# Patient Record
Sex: FEMALE | Race: WHITE | Employment: FULL TIME | URBAN - METROPOLITAN AREA
[De-identification: names, ages, dates, MRNs, and addresses within clinical notes are randomized per-mention and may not be internally consistent; named-entity substitution may affect disease eponyms.]

---

## 2018-12-10 ENCOUNTER — OFFICE VISIT (OUTPATIENT)
Dept: SURGERY | Age: 58
End: 2018-12-10

## 2018-12-10 DIAGNOSIS — E66.9 OBESITY (BMI 35.0-39.9 WITHOUT COMORBIDITY): Primary | ICD-10-CM

## 2018-12-10 DIAGNOSIS — K43.0 INCARCERATED INCISIONAL HERNIA: ICD-10-CM

## 2019-01-07 ENCOUNTER — OFFICE VISIT (OUTPATIENT)
Dept: SURGERY | Age: 59
End: 2019-01-07

## 2019-01-07 VITALS
HEIGHT: 67 IN | WEIGHT: 241.5 LBS | TEMPERATURE: 97.1 F | BODY MASS INDEX: 37.9 KG/M2 | HEART RATE: 84 BPM | OXYGEN SATURATION: 100 % | SYSTOLIC BLOOD PRESSURE: 131 MMHG | DIASTOLIC BLOOD PRESSURE: 83 MMHG | RESPIRATION RATE: 16 BRPM

## 2019-01-07 DIAGNOSIS — E66.01 SEVERE OBESITY (HCC): Primary | ICD-10-CM

## 2019-01-07 DIAGNOSIS — R60.9 EDEMA, UNSPECIFIED TYPE: ICD-10-CM

## 2019-01-07 DIAGNOSIS — R74.8 ELEVATED LIVER ENZYMES: ICD-10-CM

## 2019-01-07 DIAGNOSIS — K30 FUNCTIONAL DYSPEPSIA: ICD-10-CM

## 2019-01-07 DIAGNOSIS — G47.33 OBSTRUCTIVE SLEEP APNEA: ICD-10-CM

## 2019-01-07 DIAGNOSIS — E66.9 OBESITY (BMI 30-39.9): ICD-10-CM

## 2019-01-07 RX ORDER — FUROSEMIDE 20 MG/1
20 TABLET ORAL AS NEEDED
COMMUNITY
Start: 2017-01-13 | End: 2019-11-27

## 2019-01-07 RX ORDER — CYCLOBENZAPRINE HCL 5 MG
5 TABLET ORAL 3 TIMES DAILY
COMMUNITY
Start: 2018-12-13 | End: 2019-11-27

## 2019-01-07 RX ORDER — IBUPROFEN 200 MG
TABLET ORAL
COMMUNITY
End: 2019-11-27

## 2019-01-07 NOTE — PROGRESS NOTES
Bariatric Surgery Consultation Subjective: The patient is a 66 y.o. obese female with a Body mass index is 37.82 kg/m². .  The patient is at her heaviest weight for the past several years. she has been overweight since having children. she has been considering surgery since year. she desires surgery at this time because of multiple health concerns and their lifestyle issues which are hindered by their weight. she has been referred by his family physician Dr Verena Sher for evaluation and treatment of their obesity via surgical intervention. Angelica Mcpherson has tried multiple diets in her lifetime most recently tried physician supervised, behavior modification, unsupervised diets, Weight Watchers, Ian Eros, Atkins and Phentermine Bariatric comorbidities present are Patient Active Problem List  
Diagnosis Code  Severe obesity (HCC) E66.01  
 Obstructive sleep apnea G47.33  
 Edema R60.9  Elevated sedimentation rate R70.0  Elevated liver enzymes R74.8  Obesity (BMI 35.0-39.9 without comorbidity) E66.9  Severe obesity (BMI 35.0-39. 9) with comorbidity ([address] E66.01 The patient is considering laparoscopic gastric bypass surgery for surgical weight loss due to their ineffective progress with medical forms of weight loss and the urging of their physician who cares for their primary medical issues. The patient  now presents  for consideration for weight loss surgery understanding the benefits of this over a medical approach of weight loss as was discussed in our presentation on weight loss surgery. They have discussed their plans both with their family and primary care physician who is in support of their pursuit of such. The patient has not had health issues as of late and denies and gastrointestinal disturbances other than what is outlined below in their review of symptoms.  All of their prior evaluations available by both their PCP's and specialists physicians have been reviewed today either in the Care Everywhere portal or scanned under the media tab. I have spent a large portion of my initial consultation today reviewing the patients current dietary habits which have contributed to their health issues and obesity. I have suggested to them personally a dietary regimen that they can initiate now to help with their status as it pertains to their weight. They understand that the most important aspect of their journey through their weight loss endeavor will be their adherence to a new lifestyle of healthy eating behavior. They also understand that an adherence to an exercise program will not only help with weight loss but is ultimately important in weight maintenance. The patients goal weight is 165 lb. These goals are consistent with expected outcomes of their desired operation. her Medical goals are resolution of these health issues. Patient Active Problem List  
 Diagnosis Date Noted  Severe obesity ([address] 01/07/2019  Obstructive sleep apnea  Edema  Elevated sedimentation rate  Elevated liver enzymes  Obesity (BMI 35.0-39.9 without comorbidity)  Severe obesity (BMI 35.0-39. 9) with comorbidity ([address] Past Surgical History:  
Procedure Laterality Date  HX ORTHOPAEDIC Right 2005  
 skin graft right first digit after trauma Social History Tobacco Use  Smoking status: Never Smoker  Smokeless tobacco: Never Used Substance Use Topics  Alcohol use: Yes Frequency: 4 or more times a week Comment: wine Family History Problem Relation Age of Onset  Coronary Artery Disease Mother CABG 1999  Diabetes Father  Cancer Brother   
     skin cancer Current Outpatient Medications Medication Sig Dispense Refill  furosemide (LASIX) 20 mg tablet Take 20 mg by mouth as needed.     
 cyclobenzaprine (FLEXERIL) 5 mg tablet Take 5 mg by mouth three (3) times daily.  ibuprofen (ADVIL) 200 mg tablet Take  by mouth. No Known Allergies Review of Systems:  
  
 
 
 
General - No history or complaints of unexpected fever, chills, or weight loss Head/Neck - No history or complaints of headache, diplopia, dysphagia, hearing loss Cardiac - No history or complaints of chest pain, palpitations, murmur, or shortness of breath Pulmonary - No history or complaints of shortness of breath, productive cough, hemoptysis Gastrointestinal - Occ reflux noted which is related to spicy food,no  abdominal pain, obstipation/constipation or blood per rectum Genitourinary - No history or complaints of hematuria/dysuria, stress urinary incontinence symptoms, or renal lithiasis Musculoskeletal - has joint pain in their legs,  no muscular weakness Hematologic - No history or complaints of bleeding disorders,  No blood transfusions Neurologic - No history or complaints of  migraine headaches, seizure activity, syncopal episodes, TIA or stroke Integumentary - No history or complaints of rashes, abnormal nevi, skin cancer Gynecological - No abnormal bleeding or dysuria Objective:  
 
Visit Vitals /83 (BP 1 Location: Left arm, BP Patient Position: Sitting) Pulse 84 Temp 97.1 °F (36.2 °C) (Temporal) Resp 16 Ht 5' 7\" (1.702 m) Wt 109.5 kg (241 lb 8 oz) SpO2 100% BMI 37.82 kg/m² Physical Examination: General appearance - alert, well appearing, and in no distress and oriented to person, place, and time Mental status - alert, oriented to person, place, and time, normal mood, behavior, speech, dress, motor activity, and thought processes Eyes - pupils equal and reactive, extraocular eye movements intact, sclera anicteric, left eye normal, right eye normal 
Ears - right ear normal, left ear normal 
Nose - normal and patent, no erythema, discharge or polyps Mouth - mucous membranes moist, pharynx normal without lesions Neck - supple, no significant adenopathy Lymphatics - no palpable lymphadenopathy, no hepatosplenomegaly Chest - clear to auscultation, no wheezes, rales or rhonchi, symmetric air entry Heart - normal rate, regular rhythm, normal S1, S2, no murmurs, rubs, clicks or gallops Abdomen - soft, nontender, nondistended, no masses or organomegaly Back exam - full range of motion, no tenderness, palpable spasm or pain on motion Neurological - alert, oriented, normal speech, no focal findings or movement disorder noted Musculoskeletal - no joint tenderness, deformity or swelling Extremities - peripheral pulses normal, no pedal edema, no clubbing or cyanosis Skin - normal coloration and turgor, no rashes, no suspicious skin lesions noted Labs:  
 
No results found for: WBC, WBCLT, HGBPOC, HGB, HGBP, HCTPOC, HCT, PHCT, RBCH, PLT, MCV, HGBEXT, HCTEXT, PLTEXT, HGBEXT, HCTEXT, PLTEXT No results found for: NA, K, CL, CO2, AGAP, GLU, BUN, CREA, BUCR, GFRAA, GFRNA, CA, TBIL, TBILI, GPT, SGOT, AP, TP, ALB, GLOB, AGRAT, ALT No results found for: IRON, FE, TIBC, IBCT, PSAT, FERR No results found for: FOL, RBCF No results found for: Shelby Suárez, Sly Helms, Floyce Laughter, [id number] Reviewed labs under Care Everywhere with CMP and CBC from 12/2018. TSH checked Sept 2018. Assessment: Morbid obesity with associated comorbidity Plan:  
 
laparoscopic gastric bypass surgery This is a 66 y.o. female with a BMI of Body mass index is 37.82 kg/m². and the weight-related co-morbidties of sleep apnea. Pearl Kapoor meets the NIH criteria for bariatric surgery based upon the BMI of Body mass index is 37.82 kg/m². and multiple weight-related co-morbidties.  Pearl Kapoor has elected laparoscopic gastric bypass as her intervention of choice for treatment of morbid obestiy through surgical means secondary to its uniform results,  profound baseline suppression of hunger and pace at which weight is lost. 
 
 In the office today, following Amy's history and physical examination, a 30 minute discussion regarding the anatomic alterations for the laparoscopic gastric bypass  was undertaken. The dietary expectations and the patient  dependent factors for success were thoroughly discussed, to include the need for interval follow-up and long-term dietary changes associated with success. The possible short and long term  complications of the gastric bypass were also discussed, to include but not limited to;death, DVT/PE, staple line leak, bleeding, stricture formation, infection,internal hernia  and pouch dilation. Specific weight related outcomes for success were also discussed with an emphasis on careful and close follow-up with the first year and dietary behavior modification over the first years as baseline cyclical hunger returns  The patient expressed an understanding of the above factors, and her questions were answered in their entirety. In addition, the patient attended a 1.5 hour power point seminar regarding obesity, surgical weight loss including, adjustable gastric band, gastric bypass, and sleeve gastrectomy. This discussion contrasted the different surgical techniques, mechanisms of actions and expected outcomes, and surgical and medical risks associated with each procedure. During this seminar, there was a long question and answer session where each questions was answered until there were no additional questions. Today, the patient had all of her questions answered and desires to proceed with  bariatric surgery initially choosing the gastric bypass as her surgical option. Secondary Diagnoses:  
 
Dietary Intervention  - The patient is currently scheduled to see or has been followed by a bariatric nutritionist for an attempt at preoperative weight loss as has been dictated by their insurance carrier.   They will be assessed at various times during their follow up to evaluate their progress depending on the length of time that is required once again by their carrier. I have explained the importance of preoperative weight loss and the benefits regarding lower surgical risk and also assisting the patient in reaching their weight loss goal.  Finally they understand there is a physiologic benefit from the standpoint of hepatic volume reduction and reduction of central visceral adiposity preoperatively. I have reiterated the importance of a low carbohydrate and high protein regimen to achieve their stated goal. I have reviewed their current eating behavior prior to this encounter and explained to them in an exhaustive fashion the appropriate diet that they should adhere to. They have been encouraged to loose weight pre operatively and understand it is our prerogative to cancel surgery or postpone their procedure in the event of significant weight gain. Obstructive Sleep Apnea -The patient understands the association of sleep apnea and obesity and the additional risk that it caries related to post surgical complications. If they have not been tested for sleep apnea and I feel they are at increased risk for this diagnosis, then they will be scheduled for a consultation with a Pulmonologist for such. In the event that they walter this diagnosis we will have the patient bring their CPAP machine to the hospital for use both postoperatively in the PACU and on the floor at its appropriate setting.  We will have them continue using it while at home after surgery and follow up with their pulmonologist 6 months after to be retested to see if it can be discontinued at that time period. Signed By: Nuria Bui MD   
 January 7, 2019

## 2019-01-07 NOTE — PATIENT INSTRUCTIONS
New patient Instructions 1. Ensure all pre-operative insurances requirements are complete (ie; dietary visits, psychology consults, primary care documentation, etc) 2. Adhere to pre-operative weight loss / weight maintenance plan discussed in the office today. 3. Contact the office with any questions on pre-operative clearance issues (ie; cardiology work-up, pulmonary work-up, upper GI study, etc). 4. If a barium upper GI study has been ordered for your evaluation, make sure you are on liquids only the morning of the procedure. Body Mass Index: Care Instructions Your Care Instructions Body mass index (BMI) can help you see if your weight is raising your risk for health problems. It uses a formula to compare how much you weigh with how tall you are. · A BMI lower than 18.5 is considered underweight. · A BMI between 18.5 and 24.9 is considered healthy. · A BMI between 25 and 29.9 is considered overweight. A BMI of 30 or higher is considered obese. If your BMI is in the normal range, it means that you have a lower risk for weight-related health problems. If your BMI is in the overweight or obese range, you may be at increased risk for weight-related health problems, such as high blood pressure, heart disease, stroke, arthritis or joint pain, and diabetes. If your BMI is in the underweight range, you may be at increased risk for health problems such as fatigue, lower protection (immunity) against illness, muscle loss, bone loss, hair loss, and hormone problems. BMI is just one measure of your risk for weight-related health problems. You may be at higher risk for health problems if you are not active, you eat an unhealthy diet, or you drink too much alcohol or use tobacco products. Follow-up care is a key part of your treatment and safety.  Be sure to make and go to all appointments, and call your doctor if you are having problems. It's also a good idea to know your test results and keep a list of the medicines you take. How can you care for yourself at home? · Practice healthy eating habits. This includes eating plenty of fruits, vegetables, whole grains, lean protein, and low-fat dairy. · If your doctor recommends it, get more exercise. Walking is a good choice. Bit by bit, increase the amount you walk every day. Try for at least 30 minutes on most days of the week. · Do not smoke. Smoking can increase your risk for health problems. If you need help quitting, talk to your doctor about stop-smoking programs and medicines. These can increase your chances of quitting for good. · Limit alcohol to 2 drinks a day for men and 1 drink a day for women. Too much alcohol can cause health problems. If you have a BMI higher than 25 · Your doctor may do other tests to check your risk for weight-related health problems. This may include measuring the distance around your waist. A waist measurement of more than 40 inches in men or 35 inches in women can increase the risk of weight-related health problems. · Talk with your doctor about steps you can take to stay healthy or improve your health. You may need to make lifestyle changes to lose weight and stay healthy, such as changing your diet and getting regular exercise. If you have a BMI lower than 18.5 · Your doctor may do other tests to check your risk for health problems. · Talk with your doctor about steps you can take to stay healthy or improve your health. You may need to make lifestyle changes to gain or maintain weight and stay healthy, such as getting more healthy foods in your diet and doing exercises to build muscle. Where can you learn more? Go to http://collin-rajan.info/. Enter S176 in the search box to learn more about \"Body Mass Index: Care Instructions. \" Current as of: June 26, 2018 Content Version: 11.8 © 7216-0959 [employer]. Care instructions adapted under license by [employer] (which disclaims liability or warranty for this information). If you have questions about a medical condition or this instruction, always ask your healthcare professional. [address] any warranty or liability for your use of this information.

## 2019-01-08 ENCOUNTER — CLINICAL SUPPORT (OUTPATIENT)
Dept: SURGERY | Age: 59
End: 2019-01-08

## 2019-01-08 VITALS — BODY MASS INDEX: 38.45 KG/M2 | HEIGHT: 67 IN | WEIGHT: 245 LBS

## 2019-01-08 DIAGNOSIS — E66.01 SEVERE OBESITY (HCC): Primary | ICD-10-CM

## 2019-01-08 NOTE — PATIENT INSTRUCTIONS
Goals: 1. Start an exercise routine of walking 1-3 days a week (as able per leg pain) for 1.5 miles  2. Start taking a daily multivitamin (we recommend the [employer] complete chewable) once a day now and twice a day after surgery for life   3. Work on consistently eating three meals a day - you can use a protein shake as a meal replacement or a snack  4.  Work on switching your regular juice to diet cran-grape and cran-apple - if you want you can add unflavored protein powder to this beverage - work to get 64 ounces of non-caloric fluid a day

## 2019-02-13 ENCOUNTER — HOSPITAL ENCOUNTER (OUTPATIENT)
Age: 59
Setting detail: OUTPATIENT SURGERY
Discharge: HOME | End: 2019-02-13
Attending: SPECIALIST | Admitting: SPECIALIST
Payer: COMMERCIAL

## 2019-02-13 ENCOUNTER — CLINICAL SUPPORT (OUTPATIENT)
Dept: SURGERY | Age: 59
End: 2019-02-13

## 2019-02-13 ENCOUNTER — APPOINTMENT (OUTPATIENT)
Dept: GENERAL RADIOLOGY | Age: 59
End: 2019-02-13
Attending: SPECIALIST
Payer: COMMERCIAL

## 2019-02-13 VITALS
OXYGEN SATURATION: 98 % | TEMPERATURE: 96.7 F | DIASTOLIC BLOOD PRESSURE: 81 MMHG | SYSTOLIC BLOOD PRESSURE: 146 MMHG | HEART RATE: 92 BPM | HEIGHT: 67 IN | RESPIRATION RATE: 16 BRPM | BODY MASS INDEX: 37.15 KG/M2 | WEIGHT: 236.7 LBS

## 2019-02-13 VITALS — HEIGHT: 67 IN | WEIGHT: 238 LBS | BODY MASS INDEX: 37.35 KG/M2

## 2019-02-13 VITALS — WEIGHT: 241 LBS | BODY MASS INDEX: 37.83 KG/M2 | HEIGHT: 67 IN

## 2019-02-13 DIAGNOSIS — E66.01 MORBID OBESITY (HCC): Primary | ICD-10-CM

## 2019-02-13 DIAGNOSIS — E66.01 MORBID OBESITY (HCC): ICD-10-CM

## 2019-02-13 PROCEDURE — 76040000019: Performed by: SPECIALIST

## 2019-02-13 PROCEDURE — 74011000255 HC RX REV CODE- 255: Performed by: SPECIALIST

## 2019-02-13 PROCEDURE — 74240 X-RAY XM UPR GI TRC 1CNTRST: CPT

## 2019-02-13 PROCEDURE — 77030032490 HC SLV COMPR SCD KNE COVD -B: Performed by: SPECIALIST

## 2019-02-13 NOTE — PATIENT INSTRUCTIONS
Goals: 1. Continue working to eat three meals a day - look for a protein shake you can use or try unflavored protein powder in diet juice or tea, can try Isopure ([employer] and vitamin shop[employer])  2. Work on staying active throughout your day and as weather permits/schedule walk 1-3 days a week for 30 minutes  3.  Continue drinking diet juice and decaf unsweet tea (sweetened with splenda)

## 2019-02-13 NOTE — PROCEDURES
Patient:Amy Rodriguez   : 1960  Medical Record [id number]            PREPROCEDURE DIAGNOSIS: This patient is preoperative for laparoscopic gastric bypass surgeryprocedure with a history of  reflux disease. POSTPROCEDURE DIAGNOSIS: This patient is preoperative for laparoscopic gastric bypass surgeryprocedure with a history of  reflux disease. PROCEDURES PERFORMED: Upper GI study with barium. ESTIMATED BLOOD LOSS: None. SPECIMENS: None. STATEMENT OF MEDICAL NECESSITY: The patient is a patient with a  longstanding history of obesity. They are now considering the laparoscopic gastric bypass surgeryprocedure as a means of surgical weight control and due to their history of reflux disease and are being assessed preoperatively for such. DESCRIPTION OF PROCEDURE: The patient was brought to the fluoroscopy unit and  was given thin barium. On swallowing of barium, they were noted to have  normal peristalsis of their esophagus. They had prompt filling of distal  esophagus with tapering into the gastroesophageal junction. There was no evidence of a hiatal hernia present. Contrast then filled the gastric cardia, fundus,body and pre pyloric region with no abnormalities noted. Contrast then exited the pylorus in normal fashion. No obstruction was noted. There was no evidence of reflux noted.     (normal anatomy)    Odalis Wilkes MD

## 2019-02-13 NOTE — PROGRESS NOTES
Yesica Michele participated in an educational session on the importance of starting to make healthy choices prior to weight loss surgery. General healthy foods were reviewed. Diet history was reviewed. Patient set a dietary, exercise, and behavioral goal in order to start making healthy changes now.      Visit Vitals  Ht 5' 7\" (1.702 m)   Wt 109.3 kg (241 lb)   BMI 37.75 kg/m²     Lillie Sahni

## 2019-02-13 NOTE — PROGRESS NOTES
Medical Weight Loss Multi-Disciplinary Program    Name: Yesica Michele   : 1960    Session# 3  Date: 2019     Height: 5' 7\" (170.2 cm)    Weight: 108 kg (238 lb) lbs. Body mass index is 37.28 kg/m². Pounds Lost: 7     Dietary Instructions    Reviewed intake  Instruction given for personal dietary changes  Discussed perceived compliance  Comments: reviewed patient's past monthly diet hx. Patient has been working on eating three meals a day - still only eating 2 meals not using a protein shake yet. Did switch her juice to diet, and decreased her tea (unsweet tea with splenda) and only drinking that two times a week. Physical Activity/Exercise    Reviewed Activity Log  Discussed Perceived Compliance  Reasonable Goals Set  Motivation  Comments: patient did not exercise much this month, she does stay active throughout her day     Behavior Modification    Reviewed behavior modification log  Identify obstacles to trigger change  Achieving/Rewarding goals met  Positive attitude  Discussed perceived compliance  Comments:     Goals:  1. Continue working to eat three meals a day - look for a protein shake you can use or try unflavored protein powder in diet juice or tea, can try Isopure ([employer] and vitamin shop[employer])  2. Work on staying active throughout your day and as weather permits/schedule walk 1-3 days a week for 30 minutes  3.  Continue drinking diet juice and decaf unsweet tea (sweetened with splenda)     Candidate for surgery (per RD): pending     Dietitian: Lillie Sahni

## 2019-03-20 ENCOUNTER — CLINICAL SUPPORT (OUTPATIENT)
Dept: SURGERY | Age: 59
End: 2019-03-20

## 2019-03-20 VITALS — HEIGHT: 67 IN | BODY MASS INDEX: 37.83 KG/M2 | WEIGHT: 241 LBS

## 2019-03-20 DIAGNOSIS — E66.9 OBESITY (BMI 30-39.9): Primary | ICD-10-CM

## 2019-03-20 NOTE — PROGRESS NOTES
Medical Weight Loss Multi-Disciplinary Program    Name: Chester Shipman   : 1960    Session# 4  Date: 3/20/2019     Height: 5' 7\" (170.2 cm)    Weight: 109.3 kg (241 lb) lbs. Body mass index is 37.75 kg/m². Pounds Gained: 4    Dietary Instructions    Reviewed intake  Instruction given for personal dietary changes  Discussed perceived compliance  Comments: reviewed patient's past monthly diet hx. Patient has been working on decreasing her sugar intake - not using actual sugar, but using splenda in place. Patient is still working on eating three meals a day - going to work to use a protein shake as a meal replacement - purchased one at [employer] - but did not like it - has not tried [employer], or and unflavored protein powder. Breakfast: skipping    Lunch: salad or soup     Dinner (around 7:30-8 pm - goes to bed 10:30-11): grilled protein (steak or chicken), salad and asparagus, once in a while will eat a baked potato     Physical Activity/Exercise    Reviewed Activity Log  Discussed Perceived Compliance  Reasonable Goals Set  Motivation  Comments: patient has been walking at work, and working to walk 3 days a week for 30 minutes     Behavior Modification    Reviewed behavior modification log  Identify obstacles to trigger change  Achieving/Rewarding goals met  Positive attitude  Discussed perceived compliance  Comments:     Goals:  1.  Work on consistently eating three meals a day - focusing on 3-5 ounces of protein at all meals and 1-2 servings of non-starchy vegetables (1/2 - 1 cup each)   - For protein shakes:    - Premier protein - chocolate, vanilla, banana and caramel are the flavors available now - for the chocolate, vanilla and caramel - as a meal   replacement for breakfast    - Unflavored protein powder - GenePro ([employer],[address] or [employer]) or Matti Camara (our office) - you can put that into any sugar-free, diet, decaf   beverage with some flavoring to it, or you can put it into low-sugar yogurt or SF pudding - so in the morning you can add to your decaf artificially   sweetened iced tea - mix it in a protein shaker bottle     2. For meals: work to make sure you're eating within 2 hours of waking up and no later than 7:30 pm (if you're going to eat later than that use another protein shake in place of a heavy meal real late at night)    3. Fluid: continue working to get 64 ounces a day of non-caloric fluid a day    - Beverages that count: protein shakes, water, Crystal Light, decaf unsweet tea, propel water, G2, Powerade zero, diet snapple     4. Remember to work towards the following totals each day:   - protein: 100 grams a day    - calories: 9293-8833 a day   - 64 ounces of fluid a day    - carbohydrates: 50-75 grams or less per day     5. Food log: work to track your daily intake on a piece of paper, or you can use an sandrine like [employer]    6. Continue to work on walking 3 days a week for 30 minutes      7.. For carbohydrates: work to make sure you're measuring them out and tracking them on a daily basis - this is important because carbohydrates will come back into the picture once you reach your weight loss goal, so the more familiar/comfortable you are with measuring/tracking the less intimidating it will be to bring them back long-term   Carbohydrate foods include:  - Fruit, Grains, Starchy vegetables  One serving of a carbohydrate food = 15 grams of carbohydrate and 60-80 calories. Also, sugar and sweets in all forms (regular white sugar, brown sugar, sugar in the raw, honey, syrup, agave nectar, molasses, regular flavored coffee creamer, etc.), are carbohydrates. You are working to eliminate added sugar in your diet prior to surgery.      In general:    1 serving of  fruit = ½ cup fruit (chopped, grapes, or berries), ½ cup canned fruit in natural juice or water (discard fluid), 1 small fresh fruit, ½ banana, 2 tablespoons dried fruit, 4 oz 100% fruit juice     1 serving of starchy vegetables = ½ cup cooked (most common are potatoes of all kinds, corn, or green peas)    1 serving of grains (these vary the most so read labels and use computer/phone application if able!) =     - -Bread: 1 slice small sandwich bread, ¼ large bagel, ½ English muffin, ½ hot dog or hamburger bun, 1 small roll, 1 (6in across) tortilla    - Cereal: ¼ cup granola, ½ cup cooked oatmeal, ½ cup cooked cream of wheat, ½ cup cooked grits, ½ cup cereal     - Pasta/Rice/Quinoa/Barley: 1/3 cup cooked pasta, 1/3 cup cooked couscous, 1/3 cup cooked rice, ½ cup cooked wild rice, 1/3 cup cooked quinoa, 1/3 cup cooked barley    As always, whole grains are the best grains! Candidate for surgery (per RD):  PENDING     Dietitian: Moise Pink

## 2019-03-20 NOTE — PATIENT INSTRUCTIONS
Goals: 1. Work on consistently eating three meals a day - focusing on 3-5 ounces of protein at all meals and 1-2 servings of non-starchy vegetables (1/2 - 1 cup each)   - For protein shakes:    - Premier protein - chocolate, vanilla, banana and caramel are the flavors available now - for the chocolate, vanilla and caramel - as a meal   replacement for breakfast    - Unflavored protein powder - GenePro ([employer] or [employer]) or Valeria Malagasy (our office) - you can put that into any sugar-free, diet, decaf   beverage with some flavoring to it, or you can put it into low-sugar yogurt or SF pudding - so in the morning you can add to your decaf artificially   sweetened iced tea - mix it in a protein shaker bottle     2. For meals: work to make sure you're eating within 2 hours of waking up and no later than 7:30 pm (if you're going to eat later than that use another protein shake in place of a heavy meal real late at night)    3. Fluid: continue working to get 64 ounces a day of non-caloric fluid a day    - Beverages that count: protein shakes, water, Crystal Light, decaf unsweet tea, propel water, G2, Powerade zero, diet snapple     4. Remember to work towards the following totals each day:   - protein: 100 grams a day    - calories: 6310-6180 a day   - 64 ounces of fluid a day    - carbohydrates: 50-75 grams or less per day     5. Food log: work to track your daily intake on a piece of paper, or you can use an sandrine like [employer]    6.  Continue to work on walking 3 days a week for 30 minutes      7.. For carbohydrates: work to make sure you're measuring them out and tracking them on a daily basis - this is important because carbohydrates will come back into the picture once you reach your weight loss goal, so the more familiar/comfortable you are with measuring/tracking the less intimidating it will be to bring them back long-term   Carbohydrate foods include:  - Fruit, Grains, Starchy vegetables  One serving of a carbohydrate food = 15 grams of carbohydrate and 60-80 calories. Also, sugar and sweets in all forms (regular white sugar, brown sugar, sugar in the raw, honey, syrup, agave nectar, molasses, regular flavored coffee creamer, etc.), are carbohydrates. You are working to eliminate added sugar in your diet prior to surgery. In general:    1 serving of  fruit = ½ cup fruit (chopped, grapes, or berries), ½ cup canned fruit in natural juice or water (discard fluid), 1 small fresh fruit, ½ banana, 2 tablespoons dried fruit, 4 oz 100% fruit juice     1 serving of starchy vegetables = ½ cup cooked (most common are potatoes of all kinds, corn, or green peas)    1 serving of grains (these vary the most so read labels and use computer/phone application if able!) =     - -Bread: 1 slice small sandwich bread, ¼ large bagel, ½ English muffin, ½ hot dog or hamburger bun, 1 small roll, 1 (6in across) tortilla    - Cereal: ¼ cup granola, ½ cup cooked oatmeal, ½ cup cooked cream of wheat, ½ cup cooked grits, ½ cup cereal     - Pasta/Rice/Quinoa/Barley: 1/3 cup cooked pasta, 1/3 cup cooked couscous, 1/3 cup cooked rice, ½ cup cooked wild rice, 1/3 cup cooked quinoa, 1/3 cup cooked barley    As always, whole grains are the best grains!

## 2019-04-18 ENCOUNTER — OFFICE VISIT (OUTPATIENT)
Dept: SURGERY | Age: 59
End: 2019-04-18

## 2019-04-18 ENCOUNTER — CLINICAL SUPPORT (OUTPATIENT)
Dept: SURGERY | Age: 59
End: 2019-04-18

## 2019-04-18 VITALS
HEIGHT: 67 IN | HEART RATE: 86 BPM | DIASTOLIC BLOOD PRESSURE: 79 MMHG | SYSTOLIC BLOOD PRESSURE: 123 MMHG | WEIGHT: 236 LBS | BODY MASS INDEX: 37.04 KG/M2 | OXYGEN SATURATION: 100 % | TEMPERATURE: 97.2 F

## 2019-04-18 VITALS — HEIGHT: 67 IN | WEIGHT: 236 LBS | BODY MASS INDEX: 37.04 KG/M2

## 2019-04-18 DIAGNOSIS — E66.01 SEVERE OBESITY (HCC): Primary | ICD-10-CM

## 2019-04-18 DIAGNOSIS — R74.8 ELEVATED LIVER ENZYMES: ICD-10-CM

## 2019-04-18 DIAGNOSIS — E66.9 OBESITY (BMI 30-39.9): Primary | ICD-10-CM

## 2019-04-18 DIAGNOSIS — G47.33 OBSTRUCTIVE SLEEP APNEA: ICD-10-CM

## 2019-04-18 DIAGNOSIS — E66.01 SEVERE OBESITY (BMI 35.0-39.9) WITH COMORBIDITY (HCC): ICD-10-CM

## 2019-04-18 DIAGNOSIS — K57.92 DIVERTICULITIS: ICD-10-CM

## 2019-04-18 NOTE — PROGRESS NOTES
Medical Weight Loss Multi-Disciplinary Program    Name: Stanley Car   : 1960    Session# 5  Date: 2019     Height: 5' 7\" (170.2 cm)    Weight: 107 kg (236 lb) lbs. Body mass index is 36.96 kg/m². Pounds Lost: 6     Dietary Instructions    Reviewed intake  Instruction given for personal dietary changes  Discussed perceived compliance  Comments: reviewed patient's past monthly diet hx. Patient has been working on eating three meals a day using a protein shake as a meal replacement or snack. She's been focusing on eating protein and non-starchy vegetables at all meals and snacks. She's also been working drinking 64 ounces of non-caloric fluid a day - not drinking anything with caffeine on a daily basis - maybe unsweet iced tea two times a week. Physical Activity/Exercise    Reviewed Activity Log  Discussed Perceived Compliance  Reasonable Goals Set  Motivation  Comments: patient has been walking 3 times a week for 30 minutes     Behavior Modification    Reviewed behavior modification log  Identify obstacles to trigger change  Achieving/Rewarding goals met  Positive attitude  Discussed perceived compliance  Comments:     Goals:  1.  Work on consistently eating three meals a day - focusing on 3-5 ounces of protein at all meals and 1-2 servings of non-starchy vegetables (1/2 - 1 cup each)              - For protein shakes:                          - Premier protein - chocolate, vanilla, banana and caramel are the flavors available now - for the chocolate, vanilla and caramel - as a meal              replacement for breakfast                          - Unflavored protein powder - GenePro ([employer] or [employer]) or Shawn Vidal  (our office) - you can put that into any sugar-free, diet, decaf beverage with some flavoring to it, or you can put it into low-sugar yogurt or SF pudding - so in  the morning you can add to your decaf artificially sweetened iced tea - mix it in a protein shaker bottle    2. For meals: work to make sure you're eating within 2 hours of waking up and no later than 7:30 pm (if you're going to eat later than that use another protein shake in place of a heavy meal real late at night)     3. Fluid: continue working to get 64 ounces a day of non-caloric fluid a day               - Beverages that count: protein shakes, water, Crystal Light, decaf unsweet tea, propel water, G2, Powerade zero, diet snapple      4. Remember to work towards the following totals each day:              - protein: 100 grams a day               - calories: 1537-2707 a day              - 64 ounces of fluid a day               - carbohydrates: 50-75 grams or less per day      5. Food log: work to track your daily intake on a piece of paper, or you can use an sandrine like [employer]     6. Continue to work on walking 3 days a week for 30 minutes         Candidate for surgery (per RD):  PENDING     Dietitian: Tessie Trimble

## 2019-04-18 NOTE — PATIENT INSTRUCTIONS
Goals: 1. Work on consistently eating three meals a day - focusing on 3-5 ounces of protein at all meals and 1-2 servings of non-starchy vegetables (1/2 - 1 cup each)              - For protein shakes:                          - Premier protein - chocolate, vanilla, banana and caramel are the flavors available now - for the chocolate, vanilla and caramel - as a meal              replacement for breakfast                          - Unflavored protein powder - GenePro ([employer] [facility] or [employer]) or John Barrett  (our office) - you can put that into any sugar-free, diet, decaf beverage with some flavoring to it, or you can put it into low-sugar yogurt or SF pudding - so in  the morning you can add to your decaf artificially sweetened iced tea - mix it in a protein shaker bottle      2. For meals: work to make sure you're eating within 2 hours of waking up and no later than 7:30 pm (if you're going to eat later than that use another protein shake in place of a heavy meal real late at night)     3. Fluid: continue working to get 64 ounces a day of non-caloric fluid a day               - Beverages that count: protein shakes, water, Crystal Light, decaf unsweet tea, propel water, G2, Powerade zero, diet snapple      4. Remember to work towards the following totals each day:              - protein: 100 grams a day               - calories: 6107-6755 a day              - 64 ounces of fluid a day               - carbohydrates: 50-75 grams or less per day      5. Food log: work to track your daily intake on a piece of paper, or you can use an sandrine like [employer]     6.  Continue to work on walking 3 days a week for 30 minutes

## 2019-04-18 NOTE — PATIENT INSTRUCTIONS
Will get H. Pylori breath test at next dietitian appt in May     Body Mass Index: Care Instructions  Your Care Instructions    Body mass index (BMI) can help you see if your weight is raising your risk for health problems. It uses a formula to compare how much you weigh with how tall you are. · A BMI lower than 18.5 is considered underweight. · A BMI between 18.5 and 24.9 is considered healthy. · A BMI between 25 and 29.9 is considered overweight. A BMI of 30 or higher is considered obese. If your BMI is in the normal range, it means that you have a lower risk for weight-related health problems. If your BMI is in the overweight or obese range, you may be at increased risk for weight-related health problems, such as high blood pressure, heart disease, stroke, arthritis or joint pain, and diabetes. If your BMI is in the underweight range, you may be at increased risk for health problems such as fatigue, lower protection (immunity) against illness, muscle loss, bone loss, hair loss, and hormone problems. BMI is just one measure of your risk for weight-related health problems. You may be at higher risk for health problems if you are not active, you eat an unhealthy diet, or you drink too much alcohol or use tobacco products. Follow-up care is a key part of your treatment and safety. Be sure to make and go to all appointments, and call your doctor if you are having problems. It's also a good idea to know your test results and keep a list of the medicines you take. How can you care for yourself at home? · Practice healthy eating habits. This includes eating plenty of fruits, vegetables, whole grains, lean protein, and low-fat dairy. · If your doctor recommends it, get more exercise. Walking is a good choice. Bit by bit, increase the amount you walk every day. Try for at least 30 minutes on most days of the week. · Do not smoke. Smoking can increase your risk for health problems.  If you need help quitting, talk to your doctor about stop-smoking programs and medicines. These can increase your chances of quitting for good. · Limit alcohol to 2 drinks a day for men and 1 drink a day for women. Too much alcohol can cause health problems. If you have a BMI higher than 25  · Your doctor may do other tests to check your risk for weight-related health problems. This may include measuring the distance around your waist. A waist measurement of more than 40 inches in men or 35 inches in women can increase the risk of weight-related health problems. · Talk with your doctor about steps you can take to stay healthy or improve your health. You may need to make lifestyle changes to lose weight and stay healthy, such as changing your diet and getting regular exercise. If you have a BMI lower than 18.5  · Your doctor may do other tests to check your risk for health problems. · Talk with your doctor about steps you can take to stay healthy or improve your health. You may need to make lifestyle changes to gain or maintain weight and stay healthy, such as getting more healthy foods in your diet and doing exercises to build muscle. Where can you learn more? Go to http://collin-rajan.info/. Enter S176 in the search box to learn more about \"Body Mass Index: Care Instructions. \"  Current as of: June 25, 2018  Content Version: 11.9  © 9459-7663 [employer]. Care instructions adapted under license by [employer] (which disclaims liability or warranty for this information). If you have questions about a medical condition or this instruction, always ask your healthcare professional. [place] [address] any warranty or liability for your use of this information. Walking for Exercise: Care Instructions  Your Care Instructions    Walking is one of the easiest ways to get the exercise you need for good health.  A brisk, 30-minute walk each day can help you feel better and have more energy. It can help you lower your risk of disease. Walking can help you keep your bones strong and your heart healthy. Check with your doctor before you start a walking plan if you have heart problems, other health issues, or you have not been active in a long time. Follow your doctor's instructions for safe levels of exercise. Follow-up care is a key part of your treatment and safety. Be sure to make and go to all appointments, and call your doctor if you are having problems. It's also a good idea to know your test results and keep a list of the medicines you take. How can you care for yourself at home? Getting started  · Start slowly and set a short-term goal. For example, walk for 5 or 10 minutes every day. · Bit by bit, increase the amount you walk every day. Try for at least 30 minutes on most days of the week. You also may want to swim, bike, or do other activities. · If finding enough time is a problem, it is fine to be active in blocks of 10 minutes or more throughout your day and week. · To get the heart-healthy benefits of walking, you need to walk briskly enough to increase your heart rate and breathing, but not so fast that you cannot talk comfortably. · Wear comfortable shoes that fit well and provide good support for your feet and ankles. Staying with your plan  · After you've made walking a habit, set a longer-term goal. You may want to set a goal of walking briskly for longer or walking farther. Experts say to do 2½ hours of moderate activity a week. A faster heartbeat is what defines moderate-level activity. · To stay motivated, walk with friends, coworkers, or pets. · Use a phone sandrine or pedometer to track your steps each day. Set a goal to increase your steps. Once you get there, set a higher goal. Aim for 10,000 steps a day. · If the weather keeps you from walking outside, go for walks at the mall with a friend.  Local schools and churches may have indoor gyms where you can walk.  Fitting a walk into your workday  · Park several blocks away from work, or get off the bus a few stops early. · Use the stairs instead of the elevator, at least for a few floors. · Suggest holding meetings with colleagues during a walk inside or outside the building. · Use the restroom that is the farthest from your desk or workstation. · Use your morning and afternoon breaks to take quick 15-minute walks. Staying safe  · Know your surroundings. Walk in a well-lighted, safe place. If it is dark, walk with a partner. Wear light-colored clothing. If you can, buy a vest or jacket that reflects light. · Carry a cell phone for emergencies. · Drink plenty of water. Take a water bottle with you when you walk. This is very important if it is hot out. · Be careful not to slip on wet or icy ground. You can buy \"grippers\" for your shoes to help keep you from slipping. · Pay attention to your walking surface. Use sidewalks and paths. · If you have breathing problems like asthma or COPD, ask your doctor when it is safe for you to walk outdoors. Cold, dry air, smog, pollen, or other things in the air could cause breathing problems. Where can you learn more? Go to http://collin-rajan.info/. Enter R159 in the search box to learn more about \"Walking for Exercise: Care Instructions. \"  Current as of: December 7, 2017  Content Version: 11.8  © 4661-7352 [employer]. Care instructions adapted under license by [employer] (which disclaims liability or warranty for this information). If you have questions about a medical condition or this instruction, always ask your healthcare professional. [place] [address] any warranty or liability for your use of this information.

## 2019-04-18 NOTE — PROGRESS NOTES
Bariatric Surgery Consultation    Subjective:     Michelle Anderson is a 66 y.o. obese female with a Body mass index is 36.96 kg/m². Eliz Anderson desires surgery at this time because of health issues and quality of life issues. Michelle Anderson has been seen by a bariatric nutritionist and has been placed on an appropriate low carbohydrate diet. The patient desires laparoscopic gastric bypass surgery for surgical weight loss, however she is not currently a surgical candidate due to pending work up. Michelle Anderson is here today to check progress with weight loss / evaluate nutritional status and review all subspecialty clearances in hopes of proceeding to the operating room. Office visit notes from Jan 2019 to present have been reviewed. Michelle Anderson has increased fluid intake, is focusing on protein, is eating regularly, is taking a multivitamin & has increased her activity. No recent visits with PCP. No new medications. Was hospitalized 4 days at beginning of April at [facility] for diverticulitis. Treated with IV and po antibiotics. Just completed therapy 4 days ago and will have to get H. Pylori breath test at next dietary appointment. Patient Active Problem List    Diagnosis Date Noted    Severe obesity ([facility] [address] 01/07/2019    Obstructive sleep apnea     Edema     Elevated sedimentation rate     Elevated liver enzymes     Obesity (BMI 35.0-39.9 without comorbidity)     Severe obesity (BMI 35.0-39. 9) with comorbidity ([facility] [address]       Past Surgical History:   Procedure Laterality Date    HX ORTHOPAEDIC Right 2005    skin graft right first digit after trauma      Social History     Tobacco Use    Smoking status: Never Smoker    Smokeless tobacco: Never Used   Substance Use Topics    Alcohol use: Yes     Frequency: 4 or more times a week     Comment: wine      Family History   Problem Relation Age of Onset    Coronary Artery Disease Mother         CABG 1999    Diabetes Father     Cancer Brother         skin cancer      Current Outpatient Medications   Medication Sig Dispense Refill    furosemide (LASIX) 20 mg tablet Take 20 mg by mouth as needed.  cyclobenzaprine (FLEXERIL) 5 mg tablet Take 5 mg by mouth three (3) times daily.  ibuprofen (ADVIL) 200 mg tablet Take  by mouth. No Known Allergies       Review of Systems:        General - No history or complaints of unexpected fever, chills, or weight loss  Head/Neck - No history or complaints of headache, diplopia, dysphagia, hearing loss  Cardiac - No history or complaints of chest pain, palpitations, murmur, or shortness of breath  Pulmonary - No history or complaints of shortness of breath, productive cough, hemoptysis  Gastrointestinal - No history or complaints of reflux,  abdominal pain, obstipation/constipation, blood per rectum  Genitourinary - No history or complaints of hematuria/dysuria, stress urinary incontinence symptoms, or renal lithiasis  Musculoskeletal - No history or complaints of joint pain or muscular weakness  Hematologic - No history or complaints of bleeding disorders, blood transfusions, sickle cell anemia  Neurologic - No history or complaints of  migraine headaches, seizure activity, syncopal episodes, TIA or stroke  Integumentary - No history or complaints of rashes, abnormal nevi, skin cancer  Gynecological - No history of heavy menses/abnormal menses    Objective:     Visit Vitals  /79 (BP 1 Location: Left arm, BP Patient Position: Sitting)   Pulse 86   Temp 97.2 °F (36.2 °C) (Temporal)   Ht 5' 7\" (1.702 m)   Wt 107 kg (236 lb)   SpO2 100%   BMI 36.96 kg/m²       Physical Exam:    General:  alert, cooperative, no distress, appears stated age. Very overweight.    Lungs:   clear to auscultation bilaterally   Heart:  Regular rate and rhythm, S1S2 present or without murmur or extra heart sounds   Abdomen:   abdomen is soft without tenderness, masses, organomegaly or guarding; Active bowel sounds all 4 quadrants. Severe central obesity         Labs:     No results found for this or any previous visit (from the past 2016 hour(s)). Assessment:     Morbid obesity with associated comorbidity of sleep apnea, severe central obesity & outstanding insurance requirements. Plan: To continue current medications & routine follow-up with PCP. Continuation of Pre-Operative evaluation / clearance:  Claudean Iron has returned to the office today to discuss her status as a surgical candidate. Progress has been noted and reviewed - including review of notes from dietician. Claudean Iron is being compliant with follow-up & recommendations. Claudean Iron has 0 more pounds to lose before proceeding to the OR. (5 pounds lost since last visit)  Claudean Iron has an appointment with our dietician today & then will have 2 more nutritional visits to complete before proceeding to the OR. Additionally, 0 more medical visits are required. Claudean Iron has an outstanding psychological clearance to review before proceeding to the OR. Claudean Iron will return in 1 month to continue the pre-operative process and to work towards goals as outlined. Claudean Iron understand the rationales for all the above and plans to follow the diet & activity recommendations of the dietician. It has been discussed that given her severely obese condition that the best surgical option for this patient would be the laparoscopic gastric bypass surgery. Claudean Iron agrees with the surgical choice and has been educated in it's; risks, benefits, and alternatives. We will continue with the pre-operative evaluation as needed to check progress. Secondary Diagnoses:     Dietary Intervention  - The patient is currently followed by a bariatric nutritionist for an attempt at preoperative weight loss as has been dictated by their insurance carrier. They will be assessed at various times during their follow up to evaluate their progress depending on the length of time that is required once again by their carrier. I have explained the importance of preoperative weight loss and the benefits regarding lower surgical risk and also assisting the patient in reaching their weight loss goal.  Finally they understand there is a physiologic benefit from the standpoint of hepatic volume reduction preoperatively. I have reiterated the importance of a low carbohydrate and high protein regimen to achieve their stated goal.      Obstructive Sleep Apnea -The patient understands the association of sleep apnea and obesity and the additional risk that it caries related to post surgical complications. If they have not been tested for sleep apnea and I feel they are at increased risk for this diagnosis, then they will be scheduled for a consultation with a Pulmonologist for such. In the event that they walter this diagnosis we will have the patient bring their CPAP machine to the hospital for use both postoperatively in the PACU and on the floor at its appropriate setting.  We will have them continue using it while at home after surgery and follow up with their pulmonologist 6 months after to be retested to see if it can be discontinued at that time period. Ms. Jo Baldwin has a reminder for a \"due or due soon\" health maintenance. I have asked that she contact her primary care provider for follow-up on this health maintenance.       Signed By: Meka Manuel NP     April 18, 2019

## 2019-05-16 ENCOUNTER — CLINICAL SUPPORT (OUTPATIENT)
Dept: SURGERY | Age: 59
End: 2019-05-16

## 2019-05-16 ENCOUNTER — DOCUMENTATION ONLY (OUTPATIENT)
Dept: SURGERY | Age: 59
End: 2019-05-16

## 2019-05-16 ENCOUNTER — HOSPITAL ENCOUNTER (OUTPATIENT)
Dept: LAB | Age: 59
Discharge: HOME | End: 2019-05-16
Payer: COMMERCIAL

## 2019-05-16 VITALS — BODY MASS INDEX: 37.35 KG/M2 | HEIGHT: 67 IN | WEIGHT: 238 LBS

## 2019-05-16 DIAGNOSIS — K30 FUNCTIONAL DYSPEPSIA: ICD-10-CM

## 2019-05-16 DIAGNOSIS — E66.01 SEVERE OBESITY (BMI 35.0-39.9) WITH COMORBIDITY (HCC): Primary | ICD-10-CM

## 2019-05-16 PROCEDURE — 83013 H PYLORI (C-13) BREATH: CPT

## 2019-05-16 NOTE — PROGRESS NOTES
Medical Weight Loss Multi-Disciplinary Program    Name: Paul Echols   : 1960    Session# 6  Date: 2019     Height: 5' 7\" (170.2 cm)    Weight: 108 kg (238 lb) lbs. Body mass index is 37.28 kg/m². Pounds Gained: 2    Dietary Instructions    Reviewed intake  Dining outside home  Instruction given for personal dietary changes  Discussed perceived compliance  Comments: reviewed patient's past monthly diet hx. Patient has been working on eating three meals a day consistently using a protein shake as a meal replacement or snack. Patient has also been working on drinking 64 ounces of non-caloric fluid a day. Physical Activity/Exercise    Reviewed Activity Log  Discussed Perceived Compliance  Reasonable Goals Set  Motivation  Comments: patient has been walking throughout her work day and walked on vacation     Behavior Modification    Reviewed behavior modification log  Identify obstacles to trigger change  Achieving/Rewarding goals met  Positive attitude  Discussed perceived compliance  Comments:     Goals:  1. Continue to eat three meals a day using your protein shake as a meal replacement or snack    2. Continue working to drink 64 ounces of non-caloric fluid a day     3.  Continue walking daily during your work day at least 30+ minutes a day      Candidate for surgery (per RD) YES    Dietitian: Santy Colin

## 2019-05-16 NOTE — PATIENT INSTRUCTIONS
Goals: 1. Continue to eat three meals a day using your protein shake as a meal replacement or snack    2. Continue working to drink 64 ounces of non-caloric fluid a day     3.  Continue walking daily during your work day at least 30+ minutes a day

## 2019-05-17 LAB — UREA BREATH TEST QL: NEGATIVE

## 2019-09-09 NOTE — PROGRESS NOTES
Medical Weight Loss Multi-Disciplinary Program    Name: Ana Paula Sheppard   : 1960    Session# 2  Date: 2019     Height: 5' 7\" (170.2 cm)    Weight: 111.1 kg (245 lb) lbs. Body mass index is 38.37 kg/m². Pounds Gained: 4    Dietary Instructions    Reviewed intake  Understanding label reading  Understanding low carbohydrates, low sugar, higher protein meals  Understanding proper portions  Dining outside home  Instruction given for personal dietary changes  Discussed perceived compliance  Comments: Pt given brief pre/post-op diet ed and diet hx reviewed. Physical Activity/Exercise    Discussed Perceived Compliance  Reasonable Goals Set  Motivation  Comments: none    Behavior Modification    Positive attitude  Comments: Pt is working on the following goals:  Goals:   1. Start an exercise routine of walking 1-3 days a week (as able per leg pain) for 1.5 miles  2. Start taking a daily multivitamin (we recommend the [employer] complete chewable) once a day now and twice a day after surgery for life   3. Work on consistently eating three meals a day - you can use a protein shake as a meal replacement or a snack  4. Work on switching your regular juice to diet cran-grape and cran-apple - if you want you can add unflavored protein powder to this beverage - work to get 64 ounces of non-caloric fluid a day     Candidate for surgery (per RD): pending     Dietitian: Maritza Goodwin is a 66 y.o. female who present for a pre-op evaluation. Visit Vitals  Ht 5' 7\" (1.702 m)   Wt 111.1 kg (245 lb)   BMI 38.37 kg/m²     Past Medical History:   Diagnosis Date    Arthritic-like pain     Edema     Elevated liver enzymes     Elevated sedimentation rate     Obstructive sleep apnea     2018    Severe obesity (BMI 35.0-39. 9) with comorbidity [facility])            Procedure:  laparoscopic gastric bypass surgery     Reasons for Surgery:  BMI> 35    Summary:  Pt given brief September 9, 2019       Mahad Rodriguez MD  [address] [place] [address]  VIA In Basket      Patient: Tara Lopes   YOB: 1947   Date of Visit: 9/9/2019       Dear Dr. Rodriguez:    I saw your patient, Tara Lopes, for an evaluation. Below are my notes for this visit with her.    If you have questions, please do not hesitate to call me.      Sincerely,        Jose Hsu MD        CC: No Recipients  Jose Hsu MD  9/9/2019  1:16 PM  Sign when Signing Visit    Pulmonary Medicine Follow-up Visit      IMPRESSION:  Her obstructive sleep apnea is well treated with current cpap.  Instead of undergoing another sleep study she wants to repeat the nocturnal pulse oximetry to see if slight desaturation is still present.      RECOMMENDATIONS/PLANS:  Nocturnal pulse oximetry on cpap and call her with results.    CHIEF COMPLAINT:  Sleep Problem        HISTORY OF PRESENT ILLNESS:    Tiredness is much better since last visit.    MEDICATIONS, ALLERGIES, PAST MEDICAL HISTORY, PAST SURGICAL HISTORY, SOCIAL HISTORY, AND FAMILY HISTORY:  I have reviewed the patient's medications and allergies, past medical, surgical, social and family history, updating these as appropriate.  See other sections of the medical record for a display of this information.         PHYSICAL EXAM:  Vital Signs:  Blood pressure 138/76, pulse 94, temperature 97.7 °F (36.5 °C), temperature source Tympanic, resp. rate 14, height 5' 4\" (1.626 m), weight 91 kg, SpO2 94 %.  Neck:  Neck is supple and symmetric.  There is no JVD, thyromegaly, or lymphadenopathy.   Lungs: clear  Cardiac:  Heart is regular with normal S1 and S2.  There is no S3 or S4.  There is also no appreciable murmur or rub.   Musculoskeletal:  Extremities show no clubbing, cyanosis or edema.     ANCILLARY TESTS:  Sleep study showed moderate GWENDOLYN.  Download shows excellent compliance and apnea index.  Nocturnal pulse oximetry on cpap shows 7.36 minutes  of desaturation.      Thank you for allowing me to participate in Ms. Tara Lopes's care.          pre/post-op diet ed and diet hx reviewed. Pt instructed to follow a low calorie, low carbohydrate, high protein diet of about 0409-8820 calories daily. Pt set several goals. See below. Current Vitamins: none     What have you done in the past to try to lose weight? Patient has done/tried: Dante Mauricet, weight watchers, phentermine, was running (lowest was 143#)     Why didn't you lose weight or keep the weight off?: patient was able to lose weight, but was not able to keep the weight off once she stopped dieting     Why do you think having weight loss surgery will make it possible for you to lose weight and keep it off? Patient is here today because she is tired of being overweight and was referred by one of her neighbors who just had surgery. Patient Education and Materials Provided:  Supplement [facility], B Vitamin Information, MVI Recommendations, Calcium Citrate Information, Bariatric Supplement Companies, Protein Supplement Information, Fluid Requirements, No Caffeine or Carbonation, No Alcohol for One Year Post Op, 3 Balanced Meals a Day, Food Group Guide, Good Choices Dining Out, No Snacks, No Concentrated Sweets, Support System at Home, Exercising, Support Group Information and Addressed Current Habits / Changes to make    Nutritional Hx: What is the number of meals you eat per day? 1-2   Comment: skips breakfast     Do you eat between meals / snack? no  Typical snack: n/a     How fast do you eat your meals? In-between     How often do you eat fast food? chicken sandwich from [employer] - 2-3 times a month    How many sodas/sugared beverages do you drink per day? Iced tea - makes it with splenda - daily every single morning (large unsweet with 3 splenda)     How many caffeinated drinks do you have per day? Caffeine tea  From [employer] and at home it's decaf     How much milk and/or juice do you drink per day?  If she drinks juice it's cran-apple or cran-grape - couple times a week, patient drinks 2% milk     How much water do you drink per day? 2 16-ounce bottles a day     How often do you consume alcohol? 2-3 times a week, 1 glass of wine;     Diet History:  Breakfast  What are you eating and how much? Skips    When? ii   Where? iii   Snacks  What are you eating and how much? None    When? ii   Where? iii   Hydration  What are you eating and how much? unsweet tea from [employer]    When? ii   Where? ii   Lunch  What are you eating and how much? In [address] she'll go out to lunch: 1/2 salad and 1/2 sandwich, or a spinach salad with chicken    When? ii   Where? iii   Snacks  What are you eating and how much? None    When? ii   Where? iii   Hydration  What are you eating and how much? Ice tea    When? ii   Where? iii   Dinner  What are you eating and how much? [employer] chicken kabob    When? ii   Where? iii   Snacks  What are you eating and how much? Might have an ice cream    When? ii   Where? iii   Hydration  What are you eating and how much? Tea or water or a glass of wine    When? ii   Where? iii     Exercise:  Do you currently have an exercise routine? no - patient has inflammation in her legs - takes muscle relaxers 3 times a day     Goals:   1. Start an exercise routine of walking 1-3 days a week (as able per leg pain) for 1.5 miles  2. Start taking a daily multivitamin (we recommend the [employer]'s complete chewable) once a day now and twice a day after surgery for life   3. Work on consistently eating three meals a day - you can use a protein shake as a meal replacement or a snack  4.  Work on switching your regular juice to diet cran-grape and cran-apple - if you want you can add unflavored protein powder to this beverage - work to get 64 ounces of non-caloric fluid a day

## 2019-12-02 ENCOUNTER — HOSPITAL ENCOUNTER (OUTPATIENT)
Dept: PREADMISSION TESTING | Age: 59
Discharge: HOME | End: 2019-12-02
Payer: COMMERCIAL

## 2019-12-02 ENCOUNTER — NURSE NAVIGATOR (OUTPATIENT)
Dept: SURGERY | Age: 59
End: 2019-12-02

## 2019-12-02 ENCOUNTER — OFFICE VISIT (OUTPATIENT)
Dept: SURGERY | Age: 59
End: 2019-12-02

## 2019-12-02 VITALS
SYSTOLIC BLOOD PRESSURE: 129 MMHG | HEIGHT: 67 IN | WEIGHT: 240.8 LBS | RESPIRATION RATE: 16 BRPM | DIASTOLIC BLOOD PRESSURE: 75 MMHG | OXYGEN SATURATION: 100 % | HEART RATE: 75 BPM | TEMPERATURE: 98.3 F | BODY MASS INDEX: 37.79 KG/M2

## 2019-12-02 DIAGNOSIS — E66.01 SEVERE OBESITY (BMI 35.0-39.9) WITH COMORBIDITY (HCC): Primary | ICD-10-CM

## 2019-12-02 DIAGNOSIS — K30 FUNCTIONAL DYSPEPSIA: ICD-10-CM

## 2019-12-02 DIAGNOSIS — R74.8 ELEVATED LIVER ENZYMES: ICD-10-CM

## 2019-12-02 DIAGNOSIS — R60.9 EDEMA, UNSPECIFIED TYPE: ICD-10-CM

## 2019-12-02 DIAGNOSIS — G47.33 OBSTRUCTIVE SLEEP APNEA: ICD-10-CM

## 2019-12-02 DIAGNOSIS — G89.18 POST-OP PAIN: ICD-10-CM

## 2019-12-02 DIAGNOSIS — K57.92 DIVERTICULITIS: ICD-10-CM

## 2019-12-02 DIAGNOSIS — Z01.818 PREOPERATIVE EXAMINATION, UNSPECIFIED: ICD-10-CM

## 2019-12-02 LAB
ABO + RH BLD: NORMAL
ALBUMIN SERPL-MCNC: 3.9 G/DL (ref 3.4–5)
ALBUMIN/GLOB SERPL: 1.1 {RATIO} (ref 0.8–1.7)
ALP SERPL-CCNC: 115 U/L (ref 45–117)
ALT SERPL-CCNC: 70 U/L (ref 13–56)
ANION GAP SERPL CALC-SCNC: 8 MMOL/L (ref 3–18)
AST SERPL-CCNC: 49 U/L (ref 10–38)
ATRIAL RATE: 62 BPM
BASOPHILS # BLD: 0 K/UL (ref 0–0.1)
BASOPHILS NFR BLD: 0 % (ref 0–2)
BILIRUB SERPL-MCNC: 1.1 MG/DL (ref 0.2–1)
BLOOD GROUP ANTIBODIES SERPL: NORMAL
BUN SERPL-MCNC: 11 MG/DL (ref 7–18)
BUN/CREAT SERPL: 14 (ref 12–20)
CALCIUM SERPL-MCNC: 9.4 MG/DL (ref 8.5–10.1)
CALCULATED P AXIS, ECG09: 62 DEGREES
CALCULATED R AXIS, ECG10: 38 DEGREES
CALCULATED T AXIS, ECG11: 48 DEGREES
CHLORIDE SERPL-SCNC: 105 MMOL/L (ref 100–111)
CO2 SERPL-SCNC: 26 MMOL/L (ref 21–32)
CREAT SERPL-MCNC: 0.78 MG/DL (ref 0.6–1.3)
DIAGNOSIS, 93000: NORMAL
DIFFERENTIAL METHOD BLD: NORMAL
EOSINOPHIL # BLD: 0.1 K/UL (ref 0–0.4)
EOSINOPHIL NFR BLD: 2 % (ref 0–5)
ERYTHROCYTE [DISTWIDTH] IN BLOOD BY AUTOMATED COUNT: 12.8 % (ref 11.6–14.5)
GLOBULIN SER CALC-MCNC: 3.5 G/DL (ref 2–4)
GLUCOSE SERPL-MCNC: 81 MG/DL (ref 74–99)
HCG SERPL QL: NEGATIVE
HCT VFR BLD AUTO: 39.8 % (ref 35–45)
HGB BLD-MCNC: 13 G/DL (ref 12–16)
LYMPHOCYTES # BLD: 1.8 K/UL (ref 0.9–3.6)
LYMPHOCYTES NFR BLD: 26 % (ref 21–52)
MCH RBC QN AUTO: 30.7 PG (ref 24–34)
MCHC RBC AUTO-ENTMCNC: 32.7 G/DL (ref 31–37)
MCV RBC AUTO: 94.1 FL (ref 74–97)
MONOCYTES # BLD: 0.5 K/UL (ref 0.05–1.2)
MONOCYTES NFR BLD: 7 % (ref 3–10)
NEUTS SEG # BLD: 4.4 K/UL (ref 1.8–8)
NEUTS SEG NFR BLD: 65 % (ref 40–73)
P-R INTERVAL, ECG05: 144 MS
PLATELET # BLD AUTO: 285 K/UL (ref 135–420)
PMV BLD AUTO: 11.3 FL (ref 9.2–11.8)
POTASSIUM SERPL-SCNC: 4.2 MMOL/L (ref 3.5–5.5)
PROT SERPL-MCNC: 7.4 G/DL (ref 6.4–8.2)
Q-T INTERVAL, ECG07: 420 MS
QRS DURATION, ECG06: 82 MS
QTC CALCULATION (BEZET), ECG08: 426 MS
RBC # BLD AUTO: 4.23 M/UL (ref 4.2–5.3)
SODIUM SERPL-SCNC: 139 MMOL/L (ref 136–145)
SPECIMEN EXP DATE BLD: NORMAL
VENTRICULAR RATE, ECG03: 62 BPM
WBC # BLD AUTO: 6.8 K/UL (ref 4.6–13.2)

## 2019-12-02 PROCEDURE — 84703 CHORIONIC GONADOTROPIN ASSAY: CPT

## 2019-12-02 PROCEDURE — 93005 ELECTROCARDIOGRAM TRACING: CPT

## 2019-12-02 PROCEDURE — 80053 COMPREHEN METABOLIC PANEL: CPT

## 2019-12-02 PROCEDURE — 36415 COLL VENOUS BLD VENIPUNCTURE: CPT

## 2019-12-02 PROCEDURE — 85025 COMPLETE CBC W/AUTO DIFF WBC: CPT

## 2019-12-02 PROCEDURE — 86900 BLOOD TYPING SEROLOGIC ABO: CPT

## 2019-12-02 RX ORDER — OXYCODONE AND ACETAMINOPHEN 5; 325 MG/1; MG/1
1 TABLET ORAL
Qty: 30 TAB | Refills: 0 | Status: ON HOLD | OUTPATIENT
Start: 2019-12-02 | End: 2019-12-07

## 2019-12-02 RX ORDER — OMEPRAZOLE 20 MG/1
20 CAPSULE, DELAYED RELEASE ORAL DAILY
Qty: 30 CAP | Refills: 2 | Status: SHIPPED | OUTPATIENT
Start: 2019-12-02 | End: 2020-04-07

## 2019-12-02 RX ORDER — ENOXAPARIN SODIUM 100 MG/ML
40 INJECTION SUBCUTANEOUS EVERY 12 HOURS
Qty: 14 SYRINGE | Refills: 0 | Status: SHIPPED | OUTPATIENT
Start: 2019-12-02 | End: 2019-12-19

## 2019-12-02 NOTE — PROGRESS NOTES
1. Have you been to the ER, urgent care clinic since your last visit? Hospitalized since your last visit? No    2. Have you seen or consulted any other health care providers outside of the [address] [place] [address] since your last visit? Include any pap smears or colon screening.  No        Chief Complaint   Patient presents with    Follow-up     Pre op

## 2019-12-02 NOTE — PROGRESS NOTES
Gastric Bypass - History and Physical    Subjective: The patient is a 64 y.o. obese female with a Body mass index is 37.71 kg/m². .   she presents now to review their work up to date to see if they are a candidate for surgery and whether or not to proceed with the previously requested procedure. Bariatric comorbidities continue to include:   Patient Active Problem List   Diagnosis Code    Severe obesity ([facility] [address] E66.01    Obstructive sleep apnea G47.33    Edema R60.9    Elevated sedimentation rate R70.0    Elevated liver enzymes R74.8    Severe obesity (BMI 35.0-39. 9) with comorbidity ([facility] [address] E66.01    Diverticulitis K57.92      They have been generally well prior to this visit and have had no recent significant illnesses. The patient has had no gastrointestinal issues that would preclude them from proceeding with the surgery they have chosen. Tano Rodrigues has recently tried a preoperative weight loss program  in addition to seeing a bariatric nutritionist preoperatively. We have discussed on at least one other occasion about the various types of surgical weight loss procedures and they have considered these options after our initial consultation. We have once again discussed these procedures in detail and they have now decided on a surgical procedure. They present today to discuss this and confirm that their evaluation pre operatively is acceptable to continue with surgery. The patient desires laparoscopic gastric bypass surgery for surgical weight loss. The patients goal weight is 172 lb. (this represents a BMI of 27)    These goals are consistent with expected outcomes of their desired operation. her Medical goals are resolution of these health issues.     Patient Active Problem List    Diagnosis Date Noted    Diverticulitis 04/18/2019    Severe obesity ([facility] [address] 01/07/2019    Obstructive sleep apnea     Edema     Elevated sedimentation rate     Elevated liver enzymes     Severe obesity (BMI 35.0-39. 9) with comorbidity ([address]       Past Surgical History:   Procedure Laterality Date    HX ORTHOPAEDIC Right 2005    skin graft right first digit after trauma      Social History     Tobacco Use    Smoking status: Never Smoker    Smokeless tobacco: Never Used   Substance Use Topics    Alcohol use: Yes     Frequency: 4 or more times a week     Comment: wine- 1-2 drinks/per week      Family History   Problem Relation Age of Onset    Coronary Artery Disease Mother         CABG 1999    Diabetes Father     Cancer Brother         skin cancer        No Known Allergies     Review of Systems:        General - No history or complaints of unexpected fever, chills, or weight loss  Head/Neck - No history or complaints of headache, diplopia, dysphagia, hearing loss  Cardiac - No history or complaints of chest pain, palpitations, murmur, or shortness of breath  Pulmonary - No history or complaints of shortness of breath, productive cough, hemoptysis  Gastrointestinal - (+) history of reflux, No history or complaints of abdominal pain, obstipation/constipation, blood per rectum  Genitourinary - No history or complaints of hematuria/dysuria, stress urinary incontinence symptoms, or renal lithiasis  Musculoskeletal - No history or complaints of joint pain or muscular weakness  Hematologic - No history or complaints of bleeding disorders, blood transfusions, sickle cell anemia  Neurologic - No history or complaints of  migraine headaches, seizure activity, syncopal episodes, TIA or stroke  Integumentary - No history or complaints of rashes, abnormal nevi, skin cancer  Gynecological - unremarkable    Objective:     Visit Vitals  /75 (BP Patient Position: Sitting)   Pulse 75   Temp 98.3 °F (36.8 °C)   Resp 16   Ht 5' 7\" (1.702 m)   Wt 109.2 kg (240 lb 12.8 oz)   SpO2 100%   BMI 37.71 kg/m²       Physical Examination: General appearance - alert, well appearing, and in no distress  Mental status - alert, oriented to person, place, and time  Eyes - pupils equal and reactive, extraocular eye movements intact  Ears - bilateral TM's and external ear canals normal  Nose - normal and patent, no erythema, discharge or polyps  Mouth - mucous membranes moist, pharynx normal without lesions  Neck - supple, no significant adenopathy  Lymphatics - no palpable lymphadenopathy, no hepatosplenomegaly  Chest - clear to auscultation, no wheezes, rales or rhonchi, symmetric air entry  Heart - normal rate, regular rhythm, normal S1, S2, no murmurs, rubs, clicks or gallops  Abdomen - soft, nontender, nondistended, no masses or organomegaly  Back exam - full range of motion, no tenderness, palpable spasm or pain on motion  Neurological - alert, oriented, normal speech, no focal findings or movement disorder noted  Musculoskeletal - no joint tenderness, deformity or swelling  Extremities - peripheral pulses normal, no pedal edema, no clubbing or cyanosis  Skin - normal coloration and turgor, no rashes, no suspicious skin lesions noted    Labs / Preoperative Evaluations:     Recent Results (from the past 1008 hour(s))   EKG, 12 LEAD, INITIAL    Collection Time: 12/02/19 12:25 PM   Result Value Ref Range    Ventricular Rate 62 BPM    Atrial Rate 62 BPM    P-R Interval 144 ms    QRS Duration 82 ms    Q-T Interval 420 ms    QTC Calculation (Bezet) 426 ms    Calculated P Axis 62 degrees    Calculated R Axis 38 degrees    Calculated T Axis 48 degrees    Diagnosis       Normal sinus rhythm  Normal ECG  No previous ECGs available         Assessment:     Morbid obesity with associated comorbidity    Plan:     laparoscopic gastric bypass surgery    This is a 64 y.o. female with a BMI of Body mass index is 37.71 kg/m². and the weight-related co-morbidties as noted above. Lamberto Arroyo meets the NIH criteria for bariatric surgery based upon the BMI of Body mass index is 37.71 kg/m². and multiple weight-related co-morbidties.  Lamberto Arroyo has elected laparoscopic gastric bypass as her intervention of choice for treatment of morbid obestiy through surgical means secondary to its uniform results,  profound baseline suppression of hunger and pace at which weight is lost.    In the office today, following Amy's history and physical examination, a 40 minute discussion regarding the anatomic alterations for the laparoscopic gastric bypass  was undertaken. The dietary expectations and the patient  dependent factors for success were thoroughly discussed, to include the need for interval follow-up and long-term dietary changes associated with success. The possible short and long term  complications of the gastric bypass were also discussed, to include but not limited to;death, DVT/PE, staple line leak, bleeding, stricture formation, infection,internal hernia  and pouch dilation. Specific weight related outcomes for success were also discussed with an emphasis on careful and close follow-up with the first year and Dietary behavior modification over the first years as baseline cyclical hunger returns  The patient expressed an understanding of the above factors, and her questions were answered in their entirety. In addition, the patient attended a 1.5 hour power point seminar regarding obesity, surgical weight loss including, adjustable gastric band, gastric bypass, and sleeve gastrectomy. This discussion contrasted the different surgical techniques, mechanisms of actions and expected outcomes, and surgical and medical risks associated with each procedure. During this seminar, there was a long question and answer session where each questions was answered until there were no additional questions. Today, the patient had all of her questions answered and the decision was made today that the patient's preoperative evaluation is acceptable for them  to proceed with bariatric surgery  choosing adjustable gastric bypass as her surgical option.      The patient understands the plan of action    There has been no change to their overall medical or surgical history since their consult visit and they have been on no steroids in any form. There has been no exposure to nicotine in any form    They have lost 1 lb since their consult visit    Pre-op UGI was normal     They were not asked to obtain any special clearances prior to proceeding to surgery    Secondary Diagnoses:     DVT / Pulmonary Embolus Risk - The patient is at a higher risk for post operative DVT / pulmonary embolus secondary to their morbid obese status, relative sedentary lifestyle, and impending general anesthetic. We will plan to use anticoagulation therapy pre and post operative as well as  pneumatic compression devices and encourage ambulation once on the hospital nursing floor. The need for possible at home anticoagulation therapy has also been discussed and any decision on this matter will be made during post operative evaluations. The patient understands that their efforts at ambulation are of vital importance to reduce the risk of this complication thus placing significant burden on them as to the prevention of such issues.  Signs and symptoms of DVT / PE have been discussed with the patient and they have been instructed to call the office if any these occur in the \"at home\" post op phase    Weight Related Arthritis -The patient understands the benefits that weight loss surgery can have on their arthritis but also understands that weight loss is not a guaranteed cure and relief of symptoms is often dependent on the severity of the underlying disease.  The patient also understands that traditional pharmaceutical treatments for this diagnosis are usually unavailable to post-operative weight loss patients due to the effects on the gastrointestinal tract particularly with the gastric bypass and to a lesser effect with the sleeve gastrectomy.  Any changes to the patients medication treatment will ultimately be made the patients PCP with input by our office. Obstructive Sleep Apnea -The patient understands the association of sleep apnea and obesity and the additional risk that it caries related to post surgical complications. We will have the patient bring their CPAP machine to the hospital for use both postoperatively in the PACU and on the floor at its appropriate setting.  We will have them continue using it while at home after surgery and follow up with their pulmonologist 6 months after to be retested to see if it can be discontinued at that time period.     Signed By: Ananth Booth MD     December 2, 2019

## 2019-12-02 NOTE — H&P (VIEW-ONLY)
Gastric Bypass - History and Physical 
 
Subjective: The patient is a 64 y.o. obese female with a Body mass index is 37.71 kg/m². .   she presents now to review their work up to date to see if they are a candidate for surgery and whether or not to proceed with the previously requested procedure. Bariatric comorbidities continue to include:  
Patient Active Problem List  
Diagnosis Code  Severe obesity (HCC) E66.01  
 Obstructive sleep apnea G47.33  
 Edema R60.9  Elevated sedimentation rate R70.0  Elevated liver enzymes R74.8  Severe obesity (BMI 35.0-39. 9) with comorbidity ([address] E66.01  
 Diverticulitis K57.92 They have been generally well prior to this visit and have had no recent significant illnesses. The patient has had no gastrointestinal issues that would preclude them from proceeding with the surgery they have chosen. Ashwin Solis has recently tried a preoperative weight loss program  in addition to seeing a bariatric nutritionist preoperatively. We have discussed on at least one other occasion about the various types of surgical weight loss procedures and they have considered these options after our initial consultation. We have once again discussed these procedures in detail and they have now decided on a surgical procedure. They present today to discuss this and confirm that their evaluation pre operatively is acceptable to continue with surgery. The patient desires laparoscopic gastric bypass surgery for surgical weight loss. The patients goal weight is 172 lb. (this represents a BMI of 27) These goals are consistent with expected outcomes of their desired operation. her Medical goals are resolution of these health issues. Patient Active Problem List  
 Diagnosis Date Noted  Diverticulitis 04/18/2019  Severe obesity ([address] 01/07/2019  Obstructive sleep apnea  Edema  Elevated sedimentation rate  Elevated liver enzymes  Severe obesity (BMI 35.0-39. 9) with comorbidity ([address] Past Surgical History:  
Procedure Laterality Date  HX ORTHOPAEDIC Right 2005  
 skin graft right first digit after trauma Social History Tobacco Use  Smoking status: Never Smoker  Smokeless tobacco: Never Used Substance Use Topics  Alcohol use: Yes Frequency: 4 or more times a week Comment: wine- 1-2 drinks/per week Family History Problem Relation Age of Onset  Coronary Artery Disease Mother CABG 1999  Diabetes Father  Cancer Brother   
     skin cancer No Known Allergies Review of Systems:  
  
 
General - No history or complaints of unexpected fever, chills, or weight loss Head/Neck - No history or complaints of headache, diplopia, dysphagia, hearing loss Cardiac - No history or complaints of chest pain, palpitations, murmur, or shortness of breath Pulmonary - No history or complaints of shortness of breath, productive cough, hemoptysis Gastrointestinal - (+) history of reflux, No history or complaints of abdominal pain, obstipation/constipation, blood per rectum Genitourinary - No history or complaints of hematuria/dysuria, stress urinary incontinence symptoms, or renal lithiasis Musculoskeletal - No history or complaints of joint pain or muscular weakness Hematologic - No history or complaints of bleeding disorders, blood transfusions, sickle cell anemia Neurologic - No history or complaints of  migraine headaches, seizure activity, syncopal episodes, TIA or stroke Integumentary - No history or complaints of rashes, abnormal nevi, skin cancer Gynecological - unremarkable Objective:  
 
Visit Vitals /75 (BP Patient Position: Sitting) Pulse 75 Temp 98.3 °F (36.8 °C) Resp 16 Ht 5' 7\" (1.702 m) Wt 109.2 kg (240 lb 12.8 oz) SpO2 100% BMI 37.71 kg/m² Physical Examination: General appearance - alert, well appearing, and in no distress Mental status - alert, oriented to person, place, and time Eyes - pupils equal and reactive, extraocular eye movements intact Ears - bilateral TM's and external ear canals normal 
Nose - normal and patent, no erythema, discharge or polyps Mouth - mucous membranes moist, pharynx normal without lesions Neck - supple, no significant adenopathy Lymphatics - no palpable lymphadenopathy, no hepatosplenomegaly Chest - clear to auscultation, no wheezes, rales or rhonchi, symmetric air entry Heart - normal rate, regular rhythm, normal S1, S2, no murmurs, rubs, clicks or gallops Abdomen - soft, nontender, nondistended, no masses or organomegaly Back exam - full range of motion, no tenderness, palpable spasm or pain on motion Neurological - alert, oriented, normal speech, no focal findings or movement disorder noted Musculoskeletal - no joint tenderness, deformity or swelling Extremities - peripheral pulses normal, no pedal edema, no clubbing or cyanosis Skin - normal coloration and turgor, no rashes, no suspicious skin lesions noted Labs / Preoperative Evaluations:  
 
Recent Results (from the past 1008 hour(s)) EKG, 12 LEAD, INITIAL Collection Time: 12/02/19 12:25 PM  
Result Value Ref Range Ventricular Rate 62 BPM  
 Atrial Rate 62 BPM  
 P-R Interval 144 ms QRS Duration 82 ms Q-T Interval 420 ms QTC Calculation (Bezet) 426 ms Calculated P Axis 62 degrees Calculated R Axis 38 degrees Calculated T Axis 48 degrees Diagnosis Normal sinus rhythm Normal ECG No previous ECGs available Assessment: Morbid obesity with associated comorbidity Plan:  
 
laparoscopic gastric bypass surgery This is a 64 y.o. female with a BMI of Body mass index is 37.71 kg/m². and the weight-related co-morbidties as noted above.  Ketty Kaur meets the NIH criteria for bariatric surgery based upon the BMI of Body mass index is 37.71 kg/m². and multiple weight-related co-morbidties. Lamberto Arroyo has elected laparoscopic gastric bypass as her intervention of choice for treatment of morbid obestiy through surgical means secondary to its uniform results,  profound baseline suppression of hunger and pace at which weight is lost. 
 
In the office today, following Amy's history and physical examination, a 40 minute discussion regarding the anatomic alterations for the laparoscopic gastric bypass  was undertaken. The dietary expectations and the patient  dependent factors for success were thoroughly discussed, to include the need for interval follow-up and long-term dietary changes associated with success. The possible short and long term  complications of the gastric bypass were also discussed, to include but not limited to;death, DVT/PE, staple line leak, bleeding, stricture formation, infection,internal hernia  and pouch dilation. Specific weight related outcomes for success were also discussed with an emphasis on careful and close follow-up with the first year and Dietary behavior modification over the first years as baseline cyclical hunger returns  The patient expressed an understanding of the above factors, and her questions were answered in their entirety. In addition, the patient attended a 1.5 hour power point seminar regarding obesity, surgical weight loss including, adjustable gastric band, gastric bypass, and sleeve gastrectomy. This discussion contrasted the different surgical techniques, mechanisms of actions and expected outcomes, and surgical and medical risks associated with each procedure. During this seminar, there was a long question and answer session where each questions was answered until there were no additional questions.   
 
Today, the patient had all of her questions answered and the decision was made today that the patient's preoperative evaluation is acceptable for them  to proceed with bariatric surgery  choosing adjustable gastric bypass as her surgical option. The patient understands the plan of action There has been no change to their overall medical or surgical history since their consult visit and they have been on no steroids in any form. There has been no exposure to nicotine in any form They have lost 1 lb since their consult visit Pre-op UGI was normal  
 
They were not asked to obtain any special clearances prior to proceeding to surgery Secondary Diagnoses:  
 
DVT / Pulmonary Embolus Risk - The patient is at a higher risk for post operative DVT / pulmonary embolus secondary to their morbid obese status, relative sedentary lifestyle, and impending general anesthetic. We will plan to use anticoagulation therapy pre and post operative as well as  pneumatic compression devices and encourage ambulation once on the hospital nursing floor. The need for possible at home anticoagulation therapy has also been discussed and any decision on this matter will be made during post operative evaluations. The patient understands that their efforts at ambulation are of vital importance to reduce the risk of this complication thus placing significant burden on them as to the prevention of such issues. Signs and symptoms of DVT / PE have been discussed with the patient and they have been instructed to call the office if any these occur in the \"at home\" post op phase Weight Related Arthritis -The patient understands the benefits that weight loss surgery can have on their arthritis but also understands that weight loss is not a guaranteed cure and relief of symptoms is often dependent on the severity of the underlying disease.  The patient also understands that traditional pharmaceutical treatments for this diagnosis are usually unavailable to post-operative weight loss patients due to the effects on the gastrointestinal tract particularly with the gastric bypass and to a lesser effect with the sleeve gastrectomy.  Any changes to the patients medication treatment will ultimately be made the patients PCP with input by our office. Obstructive Sleep Apnea -The patient understands the association of sleep apnea and obesity and the additional risk that it caries related to post surgical complications. We will have the patient bring their CPAP machine to the hospital for use both postoperatively in the PACU and on the floor at its appropriate setting.  We will have them continue using it while at home after surgery and follow up with their pulmonologist 6 months after to be retested to see if it can be discontinued at that time period. Signed By: Usha Che MD   
 December 2, 2019

## 2019-12-03 VITALS — HEIGHT: 67 IN | BODY MASS INDEX: 37.71 KG/M2

## 2019-12-03 NOTE — PROGRESS NOTES
Patient attended pre-operative education class. Pt watched a video of RD explaining liquid diet and vitamins and minerals, class facilitated by bariatric coordinator. Appears to have a good understanding of the diet progression, food choices, and dietary/exercise habits for successful weight loss and nourishment after surgery. The class material included: post-op diet progression, including fluid needs, appropriate liquid choices, protein supplements, and behavior modifications recommended post-operatively. Patient previously received education booklet, during today's class the contents and appropriate sections were reviewed. Utilized education check points, class discussion, and teach back method to reinforce/ review appropriate dietary and exercise habits for optimal weight loss following surgery. Provided food list, example diet, and resources from class. Patient has contact information for any further questions or concerns. Will continue following post surgery for diet advancement.     Signed By: Pennie Ji     December 2, 2019

## 2019-12-05 ENCOUNTER — ANESTHESIA EVENT (OUTPATIENT)
Dept: SURGERY | Age: 59
DRG: 621 | End: 2019-12-05
Payer: COMMERCIAL

## 2019-12-06 ENCOUNTER — ANESTHESIA (OUTPATIENT)
Dept: SURGERY | Age: 59
DRG: 621 | End: 2019-12-06
Payer: COMMERCIAL

## 2019-12-06 ENCOUNTER — HOSPITAL ENCOUNTER (INPATIENT)
Age: 59
LOS: 1 days | Discharge: HOME | DRG: 621 | End: 2019-12-07
Attending: SPECIALIST | Admitting: SPECIALIST
Payer: COMMERCIAL

## 2019-12-06 DIAGNOSIS — G89.18 POST-OP PAIN: ICD-10-CM

## 2019-12-06 PROCEDURE — 88313 SPECIAL STAINS GROUP 2: CPT

## 2019-12-06 PROCEDURE — 77030027138 HC INCENT SPIROMETER -A: Performed by: SPECIALIST

## 2019-12-06 PROCEDURE — 65270000029 HC RM PRIVATE

## 2019-12-06 PROCEDURE — 77030020782 HC GWN BAIR PAWS FLX 3M -B: Performed by: SPECIALIST

## 2019-12-06 PROCEDURE — 74011250636 HC RX REV CODE- 250/636: Performed by: ANESTHESIOLOGY

## 2019-12-06 PROCEDURE — 77030009968 HC RELD STPLR ENDOSC J&J -D: Performed by: SPECIALIST

## 2019-12-06 PROCEDURE — 77030002912 HC SUT ETHBND J&J -A: Performed by: SPECIALIST

## 2019-12-06 PROCEDURE — 77030011810 HC STPLR ENDOSC J&J -G: Performed by: SPECIALIST

## 2019-12-06 PROCEDURE — 77030020255 HC SOL INJ LR 1000ML BG: Performed by: SPECIALIST

## 2019-12-06 PROCEDURE — 77030012893: Performed by: SPECIALIST

## 2019-12-06 PROCEDURE — 77030002896 HC SUT CLP ABSRB J&J -B: Performed by: SPECIALIST

## 2019-12-06 PROCEDURE — 74011250636 HC RX REV CODE- 250/636: Performed by: SPECIALIST

## 2019-12-06 PROCEDURE — 77030002916 HC SUT ETHLN J&J -A: Performed by: SPECIALIST

## 2019-12-06 PROCEDURE — 77030034850: Performed by: SPECIALIST

## 2019-12-06 PROCEDURE — 77030020829: Performed by: SPECIALIST

## 2019-12-06 PROCEDURE — 77030040361 HC SLV COMPR DVT MDII -B: Performed by: SPECIALIST

## 2019-12-06 PROCEDURE — 77030002966 HC SUT PDS J&J -A: Performed by: SPECIALIST

## 2019-12-06 PROCEDURE — 77030022585 HC SEAL FBRN EVICEL J&J -F: Performed by: SPECIALIST

## 2019-12-06 PROCEDURE — 77030014008 HC SPNG HEMSTAT J&J -C: Performed by: SPECIALIST

## 2019-12-06 PROCEDURE — 0BQT4ZZ REPAIR DIAPHRAGM, PERCUTANEOUS ENDOSCOPIC APPROACH: ICD-10-PCS | Performed by: SPECIALIST

## 2019-12-06 PROCEDURE — 74011000250 HC RX REV CODE- 250: Performed by: ANESTHESIOLOGY

## 2019-12-06 PROCEDURE — 88307 TISSUE EXAM BY PATHOLOGIST: CPT

## 2019-12-06 PROCEDURE — 77030034154 HC SHR COAG HARM ACE J&J -F: Performed by: SPECIALIST

## 2019-12-06 PROCEDURE — 77030012770 HC TRCR OPT FX AMR -B: Performed by: SPECIALIST

## 2019-12-06 PROCEDURE — 77030018836 HC SOL IRR NACL ICUM -A: Performed by: SPECIALIST

## 2019-12-06 PROCEDURE — 77030008603 HC TRCR ENDOSC EPATH J&J -C: Performed by: SPECIALIST

## 2019-12-06 PROCEDURE — 77030003578 HC NDL INSUF VERES AMR -B: Performed by: SPECIALIST

## 2019-12-06 PROCEDURE — 76210000006 HC OR PH I REC 0.5 TO 1 HR: Performed by: SPECIALIST

## 2019-12-06 PROCEDURE — 77030009851 HC PCH RTVR ENDOSC AMR -B: Performed by: SPECIALIST

## 2019-12-06 PROCEDURE — 77030016151 HC PROTCTR LNS DFOG COVD -B: Performed by: SPECIALIST

## 2019-12-06 PROCEDURE — 77030027138 HC INCENT SPIROMETER -A

## 2019-12-06 PROCEDURE — 77030008574 HC TBNG SUC IRR STRY -B: Performed by: SPECIALIST

## 2019-12-06 PROCEDURE — 77030036732 HC RELD STPLR VASC J&J -F: Performed by: SPECIALIST

## 2019-12-06 PROCEDURE — 77030013567 HC DRN WND RESERV BARD -A: Performed by: SPECIALIST

## 2019-12-06 PROCEDURE — 74011000250 HC RX REV CODE- 250: Performed by: SPECIALIST

## 2019-12-06 PROCEDURE — 77030005264 HC CATH JEJU BKR BARD -D: Performed by: SPECIALIST

## 2019-12-06 PROCEDURE — 77030027876 HC STPLR ENDOSC FLX PWR J&J -G1: Performed by: SPECIALIST

## 2019-12-06 PROCEDURE — 77030002986 HC SUT PROL J&J -A: Performed by: SPECIALIST

## 2019-12-06 PROCEDURE — 0FB04ZX EXCISION OF LIVER, PERCUTANEOUS ENDOSCOPIC APPROACH, DIAGNOSTIC: ICD-10-PCS | Performed by: SPECIALIST

## 2019-12-06 PROCEDURE — 77030008518 HC TBNG INSUF ENDO STRY -B: Performed by: SPECIALIST

## 2019-12-06 PROCEDURE — 76060000035 HC ANESTHESIA 2 TO 2.5 HR: Performed by: SPECIALIST

## 2019-12-06 PROCEDURE — 77030009967 HC RELD STPLR ENDOSC J&J -C: Performed by: SPECIALIST

## 2019-12-06 PROCEDURE — 77030010515 HC APPL ENDOCLP LIG J&J -B: Performed by: SPECIALIST

## 2019-12-06 PROCEDURE — 77030035694 HC MSK BIPAP FLL FAC PERFMAX PHIL -B

## 2019-12-06 PROCEDURE — 0DB68ZX EXCISION OF STOMACH, VIA NATURAL OR ARTIFICIAL OPENING ENDOSCOPIC, DIAGNOSTIC: ICD-10-PCS | Performed by: SPECIALIST

## 2019-12-06 PROCEDURE — 77030010030: Performed by: SPECIALIST

## 2019-12-06 PROCEDURE — 0D164ZA BYPASS STOMACH TO JEJUNUM, PERCUTANEOUS ENDOSCOPIC APPROACH: ICD-10-PCS | Performed by: SPECIALIST

## 2019-12-06 PROCEDURE — 77010033678 HC OXYGEN DAILY

## 2019-12-06 PROCEDURE — 77030002933 HC SUT MCRYL J&J -A: Performed by: SPECIALIST

## 2019-12-06 PROCEDURE — 77030033200 HC PRT CLSR CRTR THOMP COOP -C: Performed by: SPECIALIST

## 2019-12-06 PROCEDURE — 74011250637 HC RX REV CODE- 250/637: Performed by: SPECIALIST

## 2019-12-06 PROCEDURE — 88305 TISSUE EXAM BY PATHOLOGIST: CPT

## 2019-12-06 PROCEDURE — 77030009426 HC FCPS BIOP ENDOSC BSC -B: Performed by: SPECIALIST

## 2019-12-06 PROCEDURE — 76010000131 HC OR TIME 2 TO 2.5 HR: Performed by: SPECIALIST

## 2019-12-06 RX ORDER — SODIUM CHLORIDE, SODIUM LACTATE, POTASSIUM CHLORIDE, CALCIUM CHLORIDE 600; 310; 30; 20 MG/100ML; MG/100ML; MG/100ML; MG/100ML
150 INJECTION, SOLUTION INTRAVENOUS CONTINUOUS
Status: DISCONTINUED | OUTPATIENT
Start: 2019-12-06 | End: 2019-12-07 | Stop reason: HOSPADM

## 2019-12-06 RX ORDER — DEXAMETHASONE SODIUM PHOSPHATE 4 MG/ML
INJECTION, SOLUTION INTRA-ARTICULAR; INTRALESIONAL; INTRAMUSCULAR; INTRAVENOUS; SOFT TISSUE AS NEEDED
Status: DISCONTINUED | OUTPATIENT
Start: 2019-12-06 | End: 2019-12-06 | Stop reason: HOSPADM

## 2019-12-06 RX ORDER — HYDROMORPHONE HYDROCHLORIDE 1 MG/ML
0.5 INJECTION, SOLUTION INTRAMUSCULAR; INTRAVENOUS; SUBCUTANEOUS
Status: DISCONTINUED | OUTPATIENT
Start: 2019-12-06 | End: 2019-12-06 | Stop reason: HOSPADM

## 2019-12-06 RX ORDER — ACETAMINOPHEN 10 MG/ML
1000 INJECTION, SOLUTION INTRAVENOUS ONCE
Status: COMPLETED | OUTPATIENT
Start: 2019-12-06 | End: 2019-12-06

## 2019-12-06 RX ORDER — ACETAMINOPHEN 10 MG/ML
1000 INJECTION, SOLUTION INTRAVENOUS EVERY 6 HOURS
Status: COMPLETED | OUTPATIENT
Start: 2019-12-06 | End: 2019-12-07

## 2019-12-06 RX ORDER — LIDOCAINE HYDROCHLORIDE 20 MG/ML
INJECTION, SOLUTION EPIDURAL; INFILTRATION; INTRACAUDAL; PERINEURAL AS NEEDED
Status: DISCONTINUED | OUTPATIENT
Start: 2019-12-06 | End: 2019-12-06 | Stop reason: HOSPADM

## 2019-12-06 RX ORDER — FENTANYL CITRATE 50 UG/ML
50 INJECTION, SOLUTION INTRAMUSCULAR; INTRAVENOUS AS NEEDED
Status: DISCONTINUED | OUTPATIENT
Start: 2019-12-06 | End: 2019-12-06 | Stop reason: HOSPADM

## 2019-12-06 RX ORDER — MAGNESIUM SULFATE 100 %
4 CRYSTALS MISCELLANEOUS AS NEEDED
Status: DISCONTINUED | OUTPATIENT
Start: 2019-12-06 | End: 2019-12-06 | Stop reason: HOSPADM

## 2019-12-06 RX ORDER — FLUMAZENIL 0.1 MG/ML
0.2 INJECTION INTRAVENOUS
Status: DISCONTINUED | OUTPATIENT
Start: 2019-12-06 | End: 2019-12-06 | Stop reason: HOSPADM

## 2019-12-06 RX ORDER — GLYCOPYRROLATE 0.2 MG/ML
INJECTION INTRAMUSCULAR; INTRAVENOUS AS NEEDED
Status: DISCONTINUED | OUTPATIENT
Start: 2019-12-06 | End: 2019-12-06 | Stop reason: HOSPADM

## 2019-12-06 RX ORDER — MORPHINE SULFATE 10 MG/ML
6 INJECTION, SOLUTION INTRAMUSCULAR; INTRAVENOUS
Status: DISCONTINUED | OUTPATIENT
Start: 2019-12-06 | End: 2019-12-07

## 2019-12-06 RX ORDER — PROPOFOL 10 MG/ML
INJECTION, EMULSION INTRAVENOUS AS NEEDED
Status: DISCONTINUED | OUTPATIENT
Start: 2019-12-06 | End: 2019-12-06 | Stop reason: HOSPADM

## 2019-12-06 RX ORDER — DIPHENHYDRAMINE HYDROCHLORIDE 50 MG/ML
25 INJECTION, SOLUTION INTRAMUSCULAR; INTRAVENOUS
Status: DISCONTINUED | OUTPATIENT
Start: 2019-12-06 | End: 2019-12-07 | Stop reason: HOSPADM

## 2019-12-06 RX ORDER — NYSTATIN 100000 [USP'U]/ML
500000 SUSPENSION ORAL
Status: COMPLETED | OUTPATIENT
Start: 2019-12-06 | End: 2019-12-06

## 2019-12-06 RX ORDER — KETOROLAC TROMETHAMINE 30 MG/ML
15 INJECTION, SOLUTION INTRAMUSCULAR; INTRAVENOUS EVERY 6 HOURS
Status: DISCONTINUED | OUTPATIENT
Start: 2019-12-06 | End: 2019-12-07

## 2019-12-06 RX ORDER — BUPIVACAINE HYDROCHLORIDE AND EPINEPHRINE 2.5; 5 MG/ML; UG/ML
INJECTION, SOLUTION EPIDURAL; INFILTRATION; INTRACAUDAL; PERINEURAL AS NEEDED
Status: DISCONTINUED | OUTPATIENT
Start: 2019-12-06 | End: 2019-12-06 | Stop reason: HOSPADM

## 2019-12-06 RX ORDER — CEFAZOLIN SODIUM/WATER 2 G/20 ML
2 SYRINGE (ML) INTRAVENOUS ONCE
Status: COMPLETED | OUTPATIENT
Start: 2019-12-06 | End: 2019-12-06

## 2019-12-06 RX ORDER — NALOXONE HYDROCHLORIDE 0.4 MG/ML
0.4 INJECTION, SOLUTION INTRAMUSCULAR; INTRAVENOUS; SUBCUTANEOUS AS NEEDED
Status: DISCONTINUED | OUTPATIENT
Start: 2019-12-06 | End: 2019-12-07 | Stop reason: HOSPADM

## 2019-12-06 RX ORDER — ONDANSETRON 2 MG/ML
4 INJECTION INTRAMUSCULAR; INTRAVENOUS
Status: DISCONTINUED | OUTPATIENT
Start: 2019-12-06 | End: 2019-12-07 | Stop reason: HOSPADM

## 2019-12-06 RX ORDER — SODIUM CHLORIDE 0.9 % (FLUSH) 0.9 %
5-40 SYRINGE (ML) INJECTION EVERY 8 HOURS
Status: DISCONTINUED | OUTPATIENT
Start: 2019-12-06 | End: 2019-12-06 | Stop reason: HOSPADM

## 2019-12-06 RX ORDER — ENOXAPARIN SODIUM 100 MG/ML
40 INJECTION SUBCUTANEOUS ONCE
Status: COMPLETED | OUTPATIENT
Start: 2019-12-06 | End: 2019-12-06

## 2019-12-06 RX ORDER — FAMOTIDINE 20 MG/50ML
20 INJECTION, SOLUTION INTRAVENOUS ONCE
Status: DISCONTINUED | OUTPATIENT
Start: 2019-12-06 | End: 2019-12-06 | Stop reason: CLARIF

## 2019-12-06 RX ORDER — ONDANSETRON 2 MG/ML
INJECTION INTRAMUSCULAR; INTRAVENOUS AS NEEDED
Status: DISCONTINUED | OUTPATIENT
Start: 2019-12-06 | End: 2019-12-06 | Stop reason: HOSPADM

## 2019-12-06 RX ORDER — SODIUM CHLORIDE, SODIUM LACTATE, POTASSIUM CHLORIDE, CALCIUM CHLORIDE 600; 310; 30; 20 MG/100ML; MG/100ML; MG/100ML; MG/100ML
125 INJECTION, SOLUTION INTRAVENOUS CONTINUOUS
Status: DISCONTINUED | OUTPATIENT
Start: 2019-12-06 | End: 2019-12-06

## 2019-12-06 RX ORDER — CEFAZOLIN SODIUM/WATER 2 G/20 ML
2 SYRINGE (ML) INTRAVENOUS EVERY 8 HOURS
Status: COMPLETED | OUTPATIENT
Start: 2019-12-06 | End: 2019-12-06

## 2019-12-06 RX ORDER — NALOXONE HYDROCHLORIDE 0.4 MG/ML
0.1 INJECTION, SOLUTION INTRAMUSCULAR; INTRAVENOUS; SUBCUTANEOUS AS NEEDED
Status: DISCONTINUED | OUTPATIENT
Start: 2019-12-06 | End: 2019-12-06 | Stop reason: HOSPADM

## 2019-12-06 RX ORDER — MIDAZOLAM HYDROCHLORIDE 1 MG/ML
INJECTION, SOLUTION INTRAMUSCULAR; INTRAVENOUS AS NEEDED
Status: DISCONTINUED | OUTPATIENT
Start: 2019-12-06 | End: 2019-12-06 | Stop reason: HOSPADM

## 2019-12-06 RX ORDER — DEXTROSE MONOHYDRATE 100 MG/ML
125-250 INJECTION, SOLUTION INTRAVENOUS AS NEEDED
Status: DISCONTINUED | OUTPATIENT
Start: 2019-12-06 | End: 2019-12-06 | Stop reason: HOSPADM

## 2019-12-06 RX ORDER — HYDROMORPHONE HYDROCHLORIDE 2 MG/ML
INJECTION, SOLUTION INTRAMUSCULAR; INTRAVENOUS; SUBCUTANEOUS AS NEEDED
Status: DISCONTINUED | OUTPATIENT
Start: 2019-12-06 | End: 2019-12-06 | Stop reason: HOSPADM

## 2019-12-06 RX ORDER — FENTANYL CITRATE 50 UG/ML
INJECTION, SOLUTION INTRAMUSCULAR; INTRAVENOUS AS NEEDED
Status: DISCONTINUED | OUTPATIENT
Start: 2019-12-06 | End: 2019-12-06 | Stop reason: HOSPADM

## 2019-12-06 RX ORDER — ENOXAPARIN SODIUM 100 MG/ML
40 INJECTION SUBCUTANEOUS EVERY 12 HOURS
Status: DISCONTINUED | OUTPATIENT
Start: 2019-12-06 | End: 2019-12-07 | Stop reason: HOSPADM

## 2019-12-06 RX ORDER — ROCURONIUM BROMIDE 10 MG/ML
INJECTION, SOLUTION INTRAVENOUS AS NEEDED
Status: DISCONTINUED | OUTPATIENT
Start: 2019-12-06 | End: 2019-12-06 | Stop reason: HOSPADM

## 2019-12-06 RX ORDER — SODIUM CHLORIDE 0.9 % (FLUSH) 0.9 %
5-40 SYRINGE (ML) INJECTION AS NEEDED
Status: DISCONTINUED | OUTPATIENT
Start: 2019-12-06 | End: 2019-12-06 | Stop reason: HOSPADM

## 2019-12-06 RX ADMIN — Medication 2 G: at 15:28

## 2019-12-06 RX ADMIN — ACETAMINOPHEN 1000 MG: 10 INJECTION, SOLUTION INTRAVENOUS at 20:22

## 2019-12-06 RX ADMIN — Medication 30 MG: at 07:37

## 2019-12-06 RX ADMIN — GLYCOPYRROLATE 0.2 MG: 0.2 INJECTION INTRAMUSCULAR; INTRAVENOUS at 06:58

## 2019-12-06 RX ADMIN — KETOROLAC TROMETHAMINE 15 MG: 30 INJECTION, SOLUTION INTRAMUSCULAR at 18:49

## 2019-12-06 RX ADMIN — FENTANYL CITRATE 50 MCG: 50 INJECTION, SOLUTION INTRAMUSCULAR; INTRAVENOUS at 08:57

## 2019-12-06 RX ADMIN — SODIUM CHLORIDE, SODIUM LACTATE, POTASSIUM CHLORIDE, AND CALCIUM CHLORIDE: 600; 310; 30; 20 INJECTION, SOLUTION INTRAVENOUS at 08:17

## 2019-12-06 RX ADMIN — Medication 10 MG: at 08:10

## 2019-12-06 RX ADMIN — MIDAZOLAM 2 MG: 1 INJECTION INTRAMUSCULAR; INTRAVENOUS at 06:58

## 2019-12-06 RX ADMIN — GLYCOPYRROLATE 0.4 MG: 0.2 INJECTION INTRAMUSCULAR; INTRAVENOUS at 09:00

## 2019-12-06 RX ADMIN — ENOXAPARIN SODIUM 40 MG: 40 INJECTION, SOLUTION INTRAVENOUS; SUBCUTANEOUS at 18:50

## 2019-12-06 RX ADMIN — FENTANYL CITRATE 100 MCG: 50 INJECTION, SOLUTION INTRAMUSCULAR; INTRAVENOUS at 07:02

## 2019-12-06 RX ADMIN — Medication 50 MG: at 07:02

## 2019-12-06 RX ADMIN — SODIUM CHLORIDE, SODIUM LACTATE, POTASSIUM CHLORIDE, AND CALCIUM CHLORIDE 150 ML/HR: 600; 310; 30; 20 INJECTION, SOLUTION INTRAVENOUS at 10:32

## 2019-12-06 RX ADMIN — DEXAMETHASONE SODIUM PHOSPHATE 4 MG: 4 INJECTION, SOLUTION INTRAMUSCULAR; INTRAVENOUS at 08:10

## 2019-12-06 RX ADMIN — HYDROMORPHONE HYDROCHLORIDE 1 MG: 2 INJECTION, SOLUTION INTRAMUSCULAR; INTRAVENOUS; SUBCUTANEOUS at 07:31

## 2019-12-06 RX ADMIN — MORPHINE SULFATE 6 MG: 10 INJECTION INTRAVENOUS at 16:21

## 2019-12-06 RX ADMIN — ACETAMINOPHEN 1000 MG: 10 INJECTION, SOLUTION INTRAVENOUS at 07:04

## 2019-12-06 RX ADMIN — HYDROMORPHONE HYDROCHLORIDE 0.5 MG: 1 INJECTION, SOLUTION INTRAMUSCULAR; INTRAVENOUS; SUBCUTANEOUS at 09:43

## 2019-12-06 RX ADMIN — SODIUM CHLORIDE, SODIUM LACTATE, POTASSIUM CHLORIDE, AND CALCIUM CHLORIDE 150 ML/HR: 600; 310; 30; 20 INJECTION, SOLUTION INTRAVENOUS at 23:20

## 2019-12-06 RX ADMIN — HYDROMORPHONE HYDROCHLORIDE 0.5 MG: 1 INJECTION, SOLUTION INTRAMUSCULAR; INTRAVENOUS; SUBCUTANEOUS at 09:33

## 2019-12-06 RX ADMIN — NYSTATIN 500000 UNITS: 100000 SUSPENSION ORAL at 06:05

## 2019-12-06 RX ADMIN — ENOXAPARIN SODIUM 40 MG: 40 INJECTION, SOLUTION INTRAVENOUS; SUBCUTANEOUS at 06:06

## 2019-12-06 RX ADMIN — FAMOTIDINE 20 MG: 10 INJECTION, SOLUTION INTRAVENOUS at 06:06

## 2019-12-06 RX ADMIN — MORPHINE SULFATE 6 MG: 10 INJECTION INTRAVENOUS at 13:09

## 2019-12-06 RX ADMIN — KETOROLAC TROMETHAMINE 15 MG: 30 INJECTION, SOLUTION INTRAMUSCULAR at 11:36

## 2019-12-06 RX ADMIN — Medication 2 MG: at 07:01

## 2019-12-06 RX ADMIN — SUGAMMADEX 200 MG: 100 INJECTION, SOLUTION INTRAVENOUS at 09:00

## 2019-12-06 RX ADMIN — Medication 2 G: at 23:21

## 2019-12-06 RX ADMIN — FENTANYL CITRATE 50 MCG: 50 INJECTION, SOLUTION INTRAMUSCULAR; INTRAVENOUS at 09:03

## 2019-12-06 RX ADMIN — ONDANSETRON HYDROCHLORIDE 4 MG: 2 INJECTION INTRAMUSCULAR; INTRAVENOUS at 08:10

## 2019-12-06 RX ADMIN — MORPHINE SULFATE 6 MG: 10 INJECTION INTRAVENOUS at 20:22

## 2019-12-06 RX ADMIN — SODIUM CHLORIDE, SODIUM LACTATE, POTASSIUM CHLORIDE, AND CALCIUM CHLORIDE 125 ML/HR: 600; 310; 30; 20 INJECTION, SOLUTION INTRAVENOUS at 06:05

## 2019-12-06 RX ADMIN — ACETAMINOPHEN 1000 MG: 10 INJECTION, SOLUTION INTRAVENOUS at 15:26

## 2019-12-06 RX ADMIN — PROPOFOL 200 MG: 10 INJECTION, EMULSION INTRAVENOUS at 07:02

## 2019-12-06 RX ADMIN — LIDOCAINE HYDROCHLORIDE 80 MG: 20 INJECTION, SOLUTION EPIDURAL; INFILTRATION; INTRACAUDAL; PERINEURAL at 07:02

## 2019-12-06 RX ADMIN — KETOROLAC TROMETHAMINE 15 MG: 30 INJECTION, SOLUTION INTRAMUSCULAR at 23:20

## 2019-12-06 NOTE — INTERVAL H&P NOTE
H&P Update: 
[facility] Score was seen and examined. History and physical has been reviewed. The patient has been examined.  There have been no significant clinical changes since the completion of the originally dated History and Physical.

## 2019-12-06 NOTE — ANESTHESIA PREPROCEDURE EVALUATION
Relevant Problems   No relevant active problems       Anesthetic History   No history of anesthetic complications            Review of Systems / Medical History  Patient summary reviewed, nursing notes reviewed and pertinent labs reviewed    Pulmonary        Sleep apnea: CPAP           Neuro/Psych   Within defined limits           Cardiovascular  Within defined limits                Exercise tolerance: >4 METS     GI/Hepatic/Renal  Within defined limits              Endo/Other        Morbid obesity     Other Findings              Physical Exam    Airway  Mallampati: III  TM Distance: 4 - 6 cm  Neck ROM: normal range of motion   Mouth opening: Normal     Cardiovascular               Dental  No notable dental hx       Pulmonary                 Abdominal  GI exam deferred       Other Findings            Anesthetic Plan    ASA: 3  Anesthesia type: general          Induction: Intravenous  Anesthetic plan and risks discussed with: Patient

## 2019-12-06 NOTE — NURSE NAVIGATOR
Patients daughter at bedside. Patient dozing in and out of sleep throughout visit. Patient complained of expected pain with mild nausea. Vitals:   Visit Vitals  /67   Pulse (!) 57   Temp 97.5 °F (36.4 °C)   Resp 12   Ht 5' 7\" (1.702 m)   Wt 108.6 kg (239 lb 6.4 oz)   SpO2 97%   BMI 37.50 kg/m²     General: Alert & oriented to person, place, time, and situation  Pulmonary: Lungs clear to auscultation in all fields. Cardiac: Regular rate and rhythm  Abdomen: Appropriate tenderness; soft; non-distended;   hypo-active bowel sounds  Lap sites: Within normal limits  BRITTON drain: Small amount of serosanguinous drainage  Argueta catheter: 50 cc clear, yellow urine  SCD's: In place and operating WNL    Plan:  -Continue medications for symptom management  -Encourage ambulation  -SCD use when in bed  -IS 10 times an hour  -NPO  -UGI in AM; bariatric full liquid diet  if UGI within normal limits  -Argueta removed in AM    Plan was discussed with patient's daughter, as well as IS teaching provided. She verbalized understanding to plan and education.

## 2019-12-06 NOTE — OP NOTES
OPERATIVE REPORT                           Patient:Amy Miranda                 : 1960                Medical Record [id number]                  Pre-operative Diagnosis:  SLEEP APNEA, MORBID OBESITY, BMI 37, ELEVATED LIVER ENZYMES                Post-operative Diagnosis: SLEEP APNEA, MORBID OBESITY, BMI 37, ELEVATED LIVER ENZYMES                Procedure: Procedure(s): 1.LAPAROSCOPIC GASTRIC BYPASS                                   2.WEDGE LIVER BIOPSY                                   3. INTRAOPERATIVE ENDOSCOPY WITH BIOPSY                                   4. DIAPHRAGMATIC HERNIA REPAIR                Location: [facility]                Surgeon: Ayla Sesay MD                Assistant[facility]  [facility] (there are no qualified interns, residents, or any other qualified house physicians available to assist with this surgery) - performed retraction of various structures, facilitated creating small bowel anastomsis, placed circular stapling cap through the stomach wall, assisted in creating the gastric pouch, fired various stapling devices, obtained hemostasis along staple lines via hemoclips, applied Eviseal,  retrieved all specimens from the abdominal cavity, closed fascial defect, and sutured incisions                    Anesthesia: General                 Specimen:  Liver Wedge  Biopsy                EBL: less than 10cc                Complications: none                      STATEMENT OF MEDICAL NECESSITY: The patient is a 64y.o.-year-old female who has had a long-standing history of obesity. She has developed health problems related to  obesity and has significant cardiac disease and came to me in consultation  for the gastric bypass procedure. she has undergone preoperative evaluation and was felt to be a reasonable candidate for surgery.  I explained to the patient the risks associated with this procedure and she understood all this very clearly and did wish to proceed with operative intervention at this time period. OPERATIVE PROCEDURE: The patient was brought to the operating room, placed on the table in the supine position at which time period general anesthesia was administered without any difficulty. The abdomen was then prepped and draped in  The usual sterile fashion. Using a 15 blade, a 1 cm incision was made just to the left of the midline at the level of the umbilicus. A Veres needle approach was used to gain access to the peritoneal cavity which was then insufflated. The Visiport was then placed at that site insufflating the abdomen, then 4 additional trocars were placed in the usual U-shaped configuration with a subxiphoid incision being made to accommodate the [facility] retractor. On entering the abdomen, the patient was noted to have a moderately fatty liver with some evidence of nodularity throughout possibly consistent with early steatohepatitis as well as a diaphragmatic hernia that would be addressed later in the case. She in addition had a moderately sized hiatal hernia and plan was to repair this at the end of the procedure. I began the operation by choosing an area approximately 50 cm distal to the ligament of Treitz. It should be noted that the patient had a very tiny abdominal cavity making all aspects of the procedure very difficult due to limited space. I transected the small bowel using the Endo WILBER stapler with white reloads, I then divided the mesentery of the small bowel using 3 firings of the Endo WILBER stapler, which allowed for excellent mobilization to the upper abdominal region. I then measured off a 150-cm Jordan limb bypass and placed  it in a side-to-side fashion with the afferent limb placing stay sutures in the 2 limbs of the bowel. I then created enterotomies in the 2 limbs of the bowel and placed the Endo WILBER stapler intra luminally closing it and firing  it creating the linear anastomosis. With this anastomosis being hemostatic, the free margin of the enterotomy was then re approximated using 2-0 Ethibond suture and these sutures were then held up to facilitate closure using the stapling device. The mesenteric defect at this site was then closed  using a running 2-0 Ethibond suture. I then elected to bring the alexandra limb anterior to the transverse colon and did so in the usual fashion. The alexandra limb reached nicely to the upper abdominal region under no tension. I then placed a Baker's tube transorally in the stomach and inflated the balloon to 20 mL of saline solution and then I pulled it back towards the gastroesophageal junction. I then at this juncture dissected along the angle of His, exposing the left crura and I likewise dissected along the lesser curvature of the stomach, entering the retro gastric space. Once this space was then developed, I then marked the planned anastomosis of the pouch using a single dot of dilute methylene blue. I then removed the Baker's tube at this juncture. An anterior gastrotomy was then fashioned in the antrum of the stomach, then the cap of a 21 ILS stapler was then placed transabdominally guiding it through the gastrotomy out through the anterior gastric pouch in the area marked using a flamingo grasper and a Prolene suture attached to the cap. After retrieving the cap, a purse string suture was then placed at the base and tied securely. I then created the pouch using the powered Bryant stapler with bluereloads. I used one firing along the base of the planned pouch then 3 vertical firings completing this linear 20 mL pouch. With the pouch having been created, the anterior gastrotomy was then closed using the same stapling device. I then addressed the diaphragmatic hernia. I dissected the defect with blunt dissection until all structures were identified.   I then closed the defect using a single 2-0 interrupted Ethibond ensuring not to encroach upon the esophagus. I then at this juncture brought the Jordan limb in a antecolic, antegastric fashion. It reached nicely to the level of the pouch under no tension at all. I then opened up the free end of the Jordan limb using the Harmonic scalpel. I guided the circular stapling device transabdominally through the left lower quadrant incision, guiding it through the enterotomy thendeploying the spear through the antimesenteric border of the bowel. It was then attached to the cap, closed and fired creating the circular anastomosis. With 2 very good tissue rings  noted within the stapling device, the free margin of the Jordan limb was then closed using the Endo WILBER stapler with white reloads. I then over sewed the anastomosis using 2-0 Ethibond suture. I then left the operative field and proceeded to the the head of the bed and performed an intraoperative EGD. The scope was passed successfully into the gastric pouch to the level of the anastomosis. There was no bleeding noted and no leak appreciated with air insufflation. A biopsy for H. Pylori was   performed and submitted to pathology. I then returned to the surgical field. At this juncture I then straightened out the Jordan limb which  passed anterior to the transverse colon. When this was all achieved, I then turned my attention to checking all areas for hemostasis using Eviseal and Sugicel SNOW to achieve this. I then removed the liver retractor and due to its abnormal appearance  I did perform a liver wedge biopsy it to assess for fibrosis and submitted it to pathology for permanent section. I then placed a J-P drain in the upper abdominal region. I removed all trocars and closed the left lower quadrant trocar site using a transabdominal 0 PDS suture along the fascia. All skin incisions were then closed using 4-0 sutures of Monocryl and Steri-Strips and sterile dressings were applied. The patient tolerated the procedure well.                  Venkat Espinosa M.D.

## 2019-12-06 NOTE — PROGRESS NOTES
Transition of Care (PIETER) Plan:    Physician follow up     Chart review. Pt admitted for an elective surgical procedure (LAPAROSCOPIC GASTRIC BYPASS WITH WEDGE LIVER BIOPSY AND INTRAOPERATIVE ENDOSCOPY WITH BIOPSY, DIAPHRAGMATIC HERNIA REPAIR). Pt is independent. Please encourage ambulation. No transition of care needs identified at this time. Anticipate pt will be medically stable for discharge within the next 24-48 hours with physician follow up. CM available to assist as needed. PIETER Transportation:   How is patient being transported at discharge? Family/Friend      When? Once cleared by physician     Is transport scheduled? N/A      Follow-up appointment and transportation:   PCP/Specialist?  See AVS for Appointment         Who is transporting to the follow-up appointment? Self/Family/Friend      Is transport for follow up appointment scheduled? N/A    Communication plan (with patient/family): Who is being called? Patient or Next of Kin? Responsible party? Patient      What number(s) is to be used? See Facesheet      What service provider is calling for [facility] services? When are they calling? Readmission Risk? (Green/Low; Yellow/Moderate; Red/High):  Green    Care Management Interventions  PCP Verified by CM: Yes  Mode of Transport at Discharge:  Other (see comment)(Family)  Transition of Care Consult (CM Consult): Discharge Planning  Health Maintenance Reviewed: Yes  Current Support Network: Lives with Spouse  Confirm Follow Up Transport: Family  Discharge Location  Discharge Placement: Home with family assistance

## 2019-12-06 NOTE — PERIOP NOTES
TRANSFER - OUT REPORT:    Verbal report given to Charisse SCOTT(name) on Mariah Benavides  being transferred to [address] [place] [address](unit) for routine post - op       Report consisted of patients Situation, Background, Assessment and   Recommendations(SBAR). Information from the following report(s) OR Summary, Procedure Summary, Intake/Output and MAR was reviewed with the receiving nurse. Lines:   Peripheral IV 12/06/19 Left Hand (Active)   Site Assessment Clean, dry, & intact 12/6/2019  9:21 AM   Phlebitis Assessment 0 12/6/2019  9:21 AM   Infiltration Assessment 0 12/6/2019  9:21 AM   Dressing Status Clean, dry, & intact 12/6/2019  9:21 AM   Dressing Type Transparent;Tape 12/6/2019  9:21 AM   Hub Color/Line Status Infusing 12/6/2019  9:21 AM        Opportunity for questions and clarification was provided.       Patient transported with:   O2 @ 2 liters  Registered Nurse  Tech     Intake/Output Summary (Last 24 hours) at 12/6/2019 1009  Last data filed at 12/6/2019 1000  Gross per 24 hour   Intake 1800 ml   Output    Net 1800 ml

## 2019-12-06 NOTE — PROGRESS NOTES
conducted a pre-surgery visit with Alvina Munguia, who is a 64 y.o.,female. The  provided the following Interventions:  Initiated a relationship of care and support. Offered prayer and assurance of continued prayers on patient's behalf. Plan:  Chaplains will continue to follow and will provide pastoral care on an as needed/requested basis.  recommends bedside caregivers page  on duty if patient shows signs of acute spiritual or emotional distress.       [address] [facility]   [phone]

## 2019-12-06 NOTE — PROGRESS NOTES
4371  TRANSFER - IN REPORT:    Verbal report received from CALIN Asif RN(name) on Maximiliano Dunham  being received from PACU(unit) for routine progression of care      Report consisted of patients Situation, Background, Assessment and   Recommendations(SBAR). Information from the following report(s) SBAR, OR Summary and Recent Results was reviewed with the receiving nurse. Opportunity for questions and clarification was provided. Assessment completed upon patients arrival to unit and care assumed.        Pt has CPAP at bedside

## 2019-12-06 NOTE — ANESTHESIA POSTPROCEDURE EVALUATION
Post-Anesthesia Evaluation and Assessment    Cardiovascular Function/Vital Signs  Visit Vitals  /57   Pulse (!) 59   Temp 36.9 °C (98.4 °F)   Resp 12   Ht 5' 7\" (1.702 m)   Wt 108.6 kg (239 lb 6.4 oz)   SpO2 98%   BMI 37.50 kg/m²       Patient is status post Procedure(s):  LAPAROSCOPIC GASTRIC BYPASS WITH WEDGE LIVER BIOPSY AND INTRAOPERATIVE ENDOSCOPY WITH BIOPSY, DIAPHRAGMATIC HERNIA REPAIR. Nausea/Vomiting: Controlled. Postoperative hydration reviewed and adequate. Pain:  Pain Scale 1: FLACC (12/06/19 0949)  Pain Intensity 1: 0 (12/06/19 0949)   Managed. Neurological Status:   Neuro (WDL): Exceptions to WDL (12/06/19 0949)   At baseline. Mental Status and Level of Consciousness: Arousable. Pulmonary Status:   O2 Device: Nasal cannula (12/06/19 0925)   Adequate oxygenation and airway patent. Complications related to anesthesia: None    Post-anesthesia assessment completed. No concerns. Patient has met all discharge requirements. Signed By: Solon Crigler, MD    December 6, 2019               Procedure(s):  LAPAROSCOPIC GASTRIC BYPASS WITH WEDGE LIVER BIOPSY AND INTRAOPERATIVE ENDOSCOPY WITH BIOPSY, DIAPHRAGMATIC HERNIA REPAIR. general    <BSHSIANPOST>    Vitals Value Taken Time   /57 12/6/2019  9:50 AM   Temp 36.9 °C (98.4 °F) 12/6/2019  9:15 AM   Pulse 64 12/6/2019  9:55 AM   Resp 14 12/6/2019  9:55 AM   SpO2 97 % 12/6/2019  9:55 AM   Vitals shown include unvalidated device data.

## 2019-12-07 ENCOUNTER — APPOINTMENT (OUTPATIENT)
Dept: GENERAL RADIOLOGY | Age: 59
DRG: 621 | End: 2019-12-07
Attending: SPECIALIST
Payer: COMMERCIAL

## 2019-12-07 VITALS
HEART RATE: 91 BPM | OXYGEN SATURATION: 90 % | BODY MASS INDEX: 40.52 KG/M2 | TEMPERATURE: 98.5 F | WEIGHT: 258.16 LBS | HEIGHT: 67 IN | DIASTOLIC BLOOD PRESSURE: 52 MMHG | RESPIRATION RATE: 16 BRPM | SYSTOLIC BLOOD PRESSURE: 108 MMHG

## 2019-12-07 PROCEDURE — C9113 INJ PANTOPRAZOLE SODIUM, VIA: HCPCS | Performed by: SPECIALIST

## 2019-12-07 PROCEDURE — 74011250637 HC RX REV CODE- 250/637: Performed by: SPECIALIST

## 2019-12-07 PROCEDURE — 74011250636 HC RX REV CODE- 250/636: Performed by: SPECIALIST

## 2019-12-07 PROCEDURE — 74240 X-RAY XM UPR GI TRC 1CNTRST: CPT

## 2019-12-07 PROCEDURE — 74011636320 HC RX REV CODE- 636/320: Performed by: SPECIALIST

## 2019-12-07 PROCEDURE — 74011000250 HC RX REV CODE- 250: Performed by: SPECIALIST

## 2019-12-07 RX ORDER — OXYCODONE AND ACETAMINOPHEN 5; 325 MG/1; MG/1
1 TABLET ORAL
Status: DISCONTINUED | OUTPATIENT
Start: 2019-12-07 | End: 2019-12-07 | Stop reason: HOSPADM

## 2019-12-07 RX ORDER — ONDANSETRON 4 MG/1
4 TABLET, ORALLY DISINTEGRATING ORAL
Qty: 30 TAB | Refills: 1 | Status: SHIPPED | OUTPATIENT
Start: 2019-12-07 | End: 2020-01-02

## 2019-12-07 RX ORDER — OXYCODONE AND ACETAMINOPHEN 5; 325 MG/1; MG/1
1 TABLET ORAL
Qty: 30 TAB | Refills: 0 | Status: SHIPPED
Start: 2019-12-07 | End: 2019-12-10

## 2019-12-07 RX ORDER — KETOROLAC TROMETHAMINE 15 MG/ML
15 INJECTION, SOLUTION INTRAMUSCULAR; INTRAVENOUS EVERY 6 HOURS
Status: DISCONTINUED | OUTPATIENT
Start: 2019-12-07 | End: 2019-12-07 | Stop reason: HOSPADM

## 2019-12-07 RX ADMIN — ENOXAPARIN SODIUM 40 MG: 40 INJECTION, SOLUTION INTRAVENOUS; SUBCUTANEOUS at 06:02

## 2019-12-07 RX ADMIN — MORPHINE SULFATE 6 MG: 10 INJECTION INTRAVENOUS at 10:20

## 2019-12-07 RX ADMIN — ENOXAPARIN SODIUM 40 MG: 40 INJECTION, SOLUTION INTRAVENOUS; SUBCUTANEOUS at 17:33

## 2019-12-07 RX ADMIN — KETOROLAC TROMETHAMINE 15 MG: 15 INJECTION, SOLUTION INTRAMUSCULAR; INTRAVENOUS at 12:18

## 2019-12-07 RX ADMIN — SODIUM CHLORIDE, SODIUM LACTATE, POTASSIUM CHLORIDE, AND CALCIUM CHLORIDE 150 ML/HR: 600; 310; 30; 20 INJECTION, SOLUTION INTRAVENOUS at 06:01

## 2019-12-07 RX ADMIN — ACETAMINOPHEN 1000 MG: 10 INJECTION, SOLUTION INTRAVENOUS at 03:35

## 2019-12-07 RX ADMIN — KETOROLAC TROMETHAMINE 15 MG: 15 INJECTION, SOLUTION INTRAMUSCULAR; INTRAVENOUS at 06:02

## 2019-12-07 RX ADMIN — MORPHINE SULFATE 6 MG: 10 INJECTION INTRAVENOUS at 04:40

## 2019-12-07 RX ADMIN — SODIUM CHLORIDE 40 MG: 9 INJECTION, SOLUTION INTRAMUSCULAR; INTRAVENOUS; SUBCUTANEOUS at 09:08

## 2019-12-07 RX ADMIN — OXYCODONE HYDROCHLORIDE AND ACETAMINOPHEN 1 TABLET: 5; 325 TABLET ORAL at 17:33

## 2019-12-07 RX ADMIN — SODIUM CHLORIDE, SODIUM LACTATE, POTASSIUM CHLORIDE, AND CALCIUM CHLORIDE 150 ML/HR: 600; 310; 30; 20 INJECTION, SOLUTION INTRAVENOUS at 15:00

## 2019-12-07 RX ADMIN — IOHEXOL 100 ML: 240 INJECTION, SOLUTION INTRATHECAL; INTRAVASCULAR; INTRAVENOUS; ORAL at 10:07

## 2019-12-07 NOTE — PROGRESS NOTES
Ambulated x 1 in hallway. Did not want to walk early am. BRITTON site with serosanguinous drainage. Dressing changed. CHG wipes completed. Argueta removed at 0600. Awaiting first void. Ice packs maintained to abdomen as requested.

## 2019-12-07 NOTE — PROGRESS NOTES
4148 Noted order for UGI this AM. Informed radiology department. Leonel Murray indicates that the department is awaiting the availability of the radiologist.     1501 Awaiting void. Encouraged ambulation. Tolerating PO. Maintained LR infusion. 1600 Awaiting void. Pt ambulating in hallway ad john multiple times. 1700 Pt voided 150 ml. Reports some relief. Up again to restroom now to have BM. 1730 Dual AVS reviewed with Ron Bills RN. All medications reviewed individually with patient. Opportunities for questions and concerns provided. Patient discharged via (mode of transport ie. Car, ambulance or air transport) Car  Patient's arm band appropriately discarded.

## 2019-12-07 NOTE — DISCHARGE INSTRUCTIONS
[facility]  Marquita Gan. Radha Wilkerson M.D., F.A.C.S.  [address]. [address] [place] [address] Rd, [address] [facility]  Office: [phone]    Fax:  [phone]    Discharge Instruction for Gastric Bypass / Sleeve Gastrectomy Patients    Diet:   Continue with the liquid diet until you are seen in the office. Make sure you sip fluids all day. Aim for  Gms of protein every day. Activity:   Walking is encouraged. Rest when you are tired.  You may shower.  You may climb stairs. Take your time.  No lifting more than 10-15 lbs.  If you feel discomfort during an activity, rest.   Do not drive for 1 week. Wound Care:   Clean incisions with soap and water when in the shower. Pat dry.  Leave steri-strips on until they fall off.  A small amount of drainage may be present from the incisions. Contact the office if you notice an increase in drainage, an odor, increased redness, or fever > 100.5. Medications:   You will receive a prescription for pain medication at the time of discharge.  For upset stomach you may take over the counter medications such as Maalox, Mylanta, Pepcid, Zantac, or Tagamet.  You may take Tylenol   Non-aspirin based arthritis medications may be resumed after the first month.  Take a childrens or adult chewable multivitamin.  Milk of Magnesia will help with constipation.  It is fine to take your usual home medications. Blood pressure medications should be continued after surgery. Diabetic medications can frequently be reduced very quickly after surgery. Diabetics should continue to monitor blood sugars frequently after surgery and contact the prescribing physician for any questions. Follow-Up Appointment:   If you do not already have a follow-up appointment scheduled, contact the office in the next few days to obtain one. It is usually scheduled for 10-14 days after you surgery date. Office phone number:  [phone].         REV  09/2010

## 2019-12-07 NOTE — DISCHARGE SUMMARY
Discharge Summary    Patient: Starla Will               Sex: female          DOA: 12/6/2019         YOB: 1960      Age:  64 y.o.        LOS:  LOS: 1 day                Admit Date: 12/6/2019    Discharge Date: 12/7/2019    Admission Diagnoses: Severe obesity (BMI 35.0-39. 9) with comorbidity ([address] [E66.01]    Discharge Diagnoses:    Problem List as of 12/7/2019 Date Reviewed: 4/18/2019          Codes Class Noted - Resolved    Diverticulitis ICD-10-CM: W83.32  ICD-9-CM: 562.11  4/18/2019 - Present    Overview Signed 4/18/2019 10:35 AM by Lillian Chadwick NP     Hospitalized April 2019 [facility]             Severe obesity [facility]) ICD-10-CM: E66.01  ICD-9-CM: 278.01  1/7/2019 - Present        Obstructive sleep apnea ICD-10-CM: G47.33  ICD-9-CM: 327.23  Unknown - Present        Edema ICD-10-CM: R60.9  ICD-9-CM: 782. 3  Unknown - Present        Elevated sedimentation rate ICD-10-CM: R70.0  ICD-9-CM: 790.1  Unknown - Present        Elevated liver enzymes ICD-10-CM: R74.8  ICD-9-CM: 790.5  Unknown - Present        * (Principal) Severe obesity (BMI 35.0-39. 9) with comorbidity ([address] ICD-10-CM: E66.01  ICD-9-CM: 278.01  Unknown - Present              Discharge Condition: Good    Hospital Course: The patient underwent  laparoscopic gastric bypass surgery  on 12/6/2019. The patient tolerated the procedure well. Vital signs remained stable and the patient was transferred to  3rd floor surgical unit without complications. The patient remained stable throughout the first night post operatively with stable vital signs and adequate urine output and pain control. Pain was controlled with Dilaudid IV and IV Tylenol . The patient on the first morning post operative was transferred to the radiology suite where they underwent a gastrograffin UGI study which showed no evidence of a leak or stricture. The drain was discontinued on POD # 1 and the patient was started on a bariatric liquid diet with protein shakes. The patient progressed throughout the day and was ambulating well and tolerating their diet. They were therefore discharged home with instructions to notify me with any issues that may arise. Significant Diagnostic Studies:   No results for input(s): HGB, HGBEXT in the last 72 hours. No results for input(s): HCT, HCTEXT in the last 72 hours. Current Discharge Medication List      START taking these medications    Details   ondansetron (ZOFRAN ODT) 4 mg disintegrating tablet Take 1 Tab by mouth every eight (8) hours as needed for Nausea. Qty: 30 Tab, Refills: 1         CONTINUE these medications which have CHANGED    Details   oxyCODONE-acetaminophen (PERCOCET) 5-325 mg per tablet Take 1 Tab by mouth every six (6) hours as needed for Pain for up to 3 days. Max Daily Amount: 4 Tabs. Qty: 30 Tab, Refills: 0    Associated Diagnoses: Post-op pain         CONTINUE these medications which have NOT CHANGED    Details   omeprazole (PRILOSEC) 20 mg capsule Take 1 Cap by mouth daily. Qty: 30 Cap, Refills: 2      enoxaparin (LOVENOX) 40 mg/0.4 mL 0.4 mL by SubCUTAneous route every twelve (12) hours every twelve (12) hours. Qty: 14 Syringe, Refills: 0             Activity: activity as tolerated with no heavy lifting of greater than 20 pounds. No anti- inflammatory medications. Use stool softeners at home as needed while taking pain medications since they are constipating. Diet: Bariatric liquid diet    Wound Care: Keep wound clean and dry, Reinforce dressing PRN and ice to area for comfort. Do not get wound wet for 2 days.     Follow-up: 14 days with Dr Jason Cardona M.D

## 2019-12-07 NOTE — PROGRESS NOTES
Pt placed on home cpap unit with hospital mask due to pt missing proper mask; pt tolerating well @ this time

## 2019-12-07 NOTE — PROGRESS NOTES
DC Plan: Discharge home today with MD follow up, family assistance    Chart reviewed as CM on call. Pt admitted to medical by MD Francisco Caballero. Met with pt at bedside along with her family member. Family member to drive home at discharge. Pt independent. Pt noted to be ambulating in hallway independently. Pt has follow up appt with MD Francisco Caballero. Pt noted to be wearing oxygen on assessment, but denies home oxygen use. Nursing please wean oxygen prior to discharge as medically appropriate. No dc concerns identified. CM will cont to follow and will be available via hospital  on weekends. Care Management Interventions  PCP Verified by CM: Yes  Mode of Transport at Discharge:  Other (see comment)(Family)  Transition of Care Consult (CM Consult): Discharge Planning  Health Maintenance Reviewed: Yes  Current Support Network: Lives with Spouse  Confirm Follow Up Transport: Family  Discharge Location  Discharge Placement: Home with family assistance

## 2019-12-07 NOTE — ROUTINE PROCESS
Bedside and Verbal shift change report given to MATTHEW Chen RN (oncoming nurse) by ASHLEIGH Hu RN  (offgoing nurse). Report included the following information SBAR, Kardex, OR Summary, Intake/Output, MAR and Recent Results.

## 2019-12-07 NOTE — PROGRESS NOTES
Bariatric Surgery                POD #1    Visit Vitals  /52 (BP 1 Location: Right arm, BP Patient Position: At rest)   Pulse 79   Temp 98.3 °F (36.8 °C)   Resp 16   Ht 5' 7\" (1.702 m)   Wt 117.1 kg (258 lb 2.5 oz)   SpO2 95%   BMI 40.43 kg/m²     Patient has minimal complaints of pain, minimal nausea noted     Exam:  Appears well in no distress  Lungs- clear bilaterally  Abd - soft, incisions look good without erythema           BRITTON with minimal serosanguinous output  Extremities- no new edema or swelling    UGI - pending    Data Review:    Labs: Results:       Chemistry No results for input(s): GLU, NA, K, CL, CO2, BUN, CREA, CA, AGAP, BUCR, TBIL, GPT, AP, TP, ALB, GLOB, AGRAT in the last 72 hours. CBC w/Diff No results for input(s): WBC, RBC, HGB, HCT, PLT, GRANS, LYMPH, EOS, RETIC, HGBEXT, HCTEXT, PLTEXT in the last 72 hours. Coagulation No results for input(s): PTP, INR, APTT, INREXT in the last 72 hours. Liver Enzymes No results for input(s): TP, ALB, TBIL, AP, SGOT, GPT in the last 72 hours. No lab exists for component: DBIL       Assessment/Plan: S/P  laparoscopic gastric bypass surgery - doing well without any issues    Following orders after UGI has been confirmed normal -     1. Start bariatric diet and protein shakes  2. D/C IV pain meds and start PO pain meds  3. D/C BRITTON drain  4. Likley PM D/C if  Cont ok and tolerate PO

## 2019-12-09 ENCOUNTER — TELEPHONE (OUTPATIENT)
Dept: SURGERY | Age: 59
End: 2019-12-09

## 2019-12-09 NOTE — TELEPHONE ENCOUNTER
This RN spoke with patient post-operatively. Sipping: Patient is up to sipping 39 ounces. Nausea and/or vomitting: None    Pain: Currently managed with Percocet and/or Tylenol    Lovenox injections: Administering every 12 hours, rotating sites. RN reminded patient to complete all injections, in which patient verbalized understanding. Lap sites: No erythema, drainage, and/or swelling    BRITTON drain site: No drainage; dressing removed    BM: None to date, but is passing flatus. Ambulation: Patient is walking throughout house every hour. IS: Patient continues to use 10x's per hour while awake. Temperature: 99.3 degrees    Pulse: 92 bpm    BP: 136/80    Medications: (confirmed currently taking)   *Multi-vitamin: yes   *Probiotic: yes   *Prilosec: yes    Questions: None    This RN reminded the patient to contact the office with any questions and/or concerns. RN also reminded patient they will receive another follow-up TC prior to the two week post-op follow-up appointment. Patient verbalized understanding to both. Patient's two week post-op visit is scheduled and was confirmed.

## 2019-12-12 PROBLEM — K90.9 INTESTINAL MALABSORPTION: Status: ACTIVE | Noted: 2019-12-12

## 2019-12-12 PROBLEM — Z98.84 S/P GASTRIC BYPASS: Status: ACTIVE | Noted: 2019-12-12

## 2019-12-12 PROBLEM — K76.0 STEATOSIS OF LIVER: Status: ACTIVE | Noted: 2019-12-12

## 2019-12-16 ENCOUNTER — TELEPHONE (OUTPATIENT)
Dept: SURGERY | Age: 59
End: 2019-12-16

## 2019-12-17 ENCOUNTER — TELEPHONE (OUTPATIENT)
Dept: SURGERY | Age: 59
End: 2019-12-17

## 2019-12-19 ENCOUNTER — CLINICAL SUPPORT (OUTPATIENT)
Dept: SURGERY | Age: 59
End: 2019-12-19

## 2019-12-19 ENCOUNTER — OFFICE VISIT (OUTPATIENT)
Dept: SURGERY | Age: 59
End: 2019-12-19

## 2019-12-19 VITALS
WEIGHT: 226 LBS | BODY MASS INDEX: 35.47 KG/M2 | DIASTOLIC BLOOD PRESSURE: 84 MMHG | HEART RATE: 88 BPM | TEMPERATURE: 97.8 F | SYSTOLIC BLOOD PRESSURE: 106 MMHG | HEIGHT: 67 IN | OXYGEN SATURATION: 99 %

## 2019-12-19 DIAGNOSIS — K90.9 INTESTINAL MALABSORPTION, UNSPECIFIED TYPE: Primary | ICD-10-CM

## 2019-12-19 DIAGNOSIS — R74.8 ELEVATED LIVER ENZYMES: ICD-10-CM

## 2019-12-19 DIAGNOSIS — K76.0 STEATOSIS OF LIVER: ICD-10-CM

## 2019-12-19 DIAGNOSIS — E66.01 SEVERE OBESITY (BMI 35.0-39.9) WITH COMORBIDITY (HCC): Primary | ICD-10-CM

## 2019-12-19 DIAGNOSIS — Z98.84 S/P BARIATRIC SURGERY: ICD-10-CM

## 2019-12-19 RX ORDER — URSODIOL 500 MG/1
500 TABLET, FILM COATED ORAL DAILY
Qty: 30 TAB | Refills: 4 | Status: SHIPPED | OUTPATIENT
Start: 2019-12-19 | End: 2020-01-18

## 2019-12-19 NOTE — PROGRESS NOTES
Reviewed diet progression for weeks 3-4. Patient appears to have a good understanding of the diet progression, food choices, and dietary/exercise habits for successful weight loss and nourishment after surgery. The class material included: post-op diet progression, including soft/pureed high protein low fat, low sugar food recommendations; proper food group choices. We reviewed appropriate food choices, cooking techniques, and eating behavior modifications. Discussed intake regimen with 3 meals and 2-3 protein supplements per day. Reinforced the importance of adequate fluid with goal of 64 oz per day and adequate protein with goal of  grams per day.      Radha Soriano RD

## 2019-12-19 NOTE — PATIENT INSTRUCTIONS
Patient Instructions 1. Remember hydration goals - minimum of 64 ounces of liquids per day (dehydration is the number one reason for hospital readmission). 2. Sleep 7-9 hours each night to keep your metabolism up. 3. Continue to monitor carbohydrate and protein intake you need a minimum of  Grams of protein daily- remember to keep your total carbohydrates to 50 grams or less per day for best results. 4. To maximize weight loss keep your caloric intake between 800-1,200 calories daily. If you are exercising excessively, such as training for a marathon, you need to keep a food log and meet with the dietician so they can advise you on your diet choices, carbohydrate intake and caloric intake. 5. Continue to work towards exercise goals - 60-90 minutes, 5 times a week minimum of deliberate, aerobic exercise is the ultimate goal with strength training 2 times each week. Refer to [employer] for  information. 6. Remember to take vitamins as directed in your handbook. 7. Attend support group the 2nd Thursday of each month. 8. Constipation: Milk of Magnesia is for immediate relief only. Miralax is to be used every day if constipation is a chronic problem. 9. Diarrhea: patients will occasionally develop lactose intolerance after surgery. Check to see if your protein shake has whey in it. If it does try a protein powder or drink that does not have whey and stop all yogurts, cheeses and milks to see if the diarrhea goes away. 10. If you have had labs drawn. We will only call you if you have abnormal results. Otherwise you can access the lab results in \"[employer]\". You will only need the access code the first time you sign on.    
11. Call us at [phone] or email us through [email or web address]" with questions,     concerns or worsening of condition, we have someone on call 24 hours a day. If you are unable to reach our office, you are to go to your Primary Care Physician or the Emergency Department. NOTE TO GASTRIC BYPASS PATIENTS:  (SAME APPLIES TO GASTRIC SLEEVE PATIENTS FOR FIRST TWO MONTHS) Remember that for the rest of your life, you are not able to take the following: 
- NSAIDs (ibuprofen, goody powder, BC powder, Motrin, Advil, Mobic, Voltaren, Excedrin, etc.) - Steroid pills or injections - Smoke (cigarettes or recreational drugs) - Alcohol Use of any of the above may cause ulcers in your stomach which may perforate causing a medical emergency and surgery. Speak to our medical staff if another medical provider requires you to take steroids or NSAIDs. Supplement Resource Guide Importance of Protein:  
Maintains lean body mass, produces antibodies to fight off infections, heals wounds, minimizes hair loss, helps to give you energy, helps with satiety, and keeping you full between meals. Importance of Calcium: 
Needed for healthy bones and teeth, normal blood clotting, and nervous system functioning, higher risk of osteoporosis and bone disease with non-compliance. Importance of Multivitamins: Many functions. Supply you with extra nutrients that you may be missing from food. May lead to iron deficiency anemia, weakness, fatigue, and many other symptoms with non-compliance. Importance of B Vitamins: 
Important for red blood cell formation, metabolism, energy, and helps to maintain a healthy nervous system. Protein Supplement Liquid diet phase: consume 90-100g protein daily. Once you are eating consume 35-50g protein each day from your protein supplement. 0-3 g fat per serving 0-3 g sugar per serving The body can only absorb 30g of protein at one time, so do not consume more than that at one time. Multi-vitamin Supplement:   
Start immediately after surgery: any complete chewable, such as: Oranges Complete chewables. Avoid [place] sours or gummies. They lack iron and other important nutrients and also have added sugar. Continue with a chewable vitamin or change to an adult complete multivitamin one month after surgery. Menstruating women can take a prenatal vitamin. Make sure it has at least 18 mg iron and 252-757 mcg folic acid Calcium Supplement:  
 
Start taking within one month after surgery. Look for:  
Calcium Citrate Plus D (1500 mg per day) Recommend: Citracal 
 
Avoid chocolate chewable calcium. Can use chewable bariatric or [employer] brand or similar chewable. The body cannot absorb more than 500-600 mg of calcium at one time. Take for Life Vitamin D Take 3,000 international units daily Vitamin B12 B Complex Vitamin Start taking both within one month after surgery. Vitamin B12 (sublingual): Take 1000 mcg of Vitamin B12 three times weekly Must take sublingually (meaning you put it under your tongue) or in a liquid drop form for easy absorption. B Complex Vitamin:  
Take one pill daily or liquid drop form daily; as directed on bottle. Take for Life

## 2019-12-19 NOTE — PROGRESS NOTES
Subjective:      Kang Perkins is a 64 y.o. female is now 2 weeks status post laparoscopic gastric bypass surgery. Doing well overall. She has lost a total of 15 pounds since surgery. Body mass index is 35.4 kg/m². Currently on a liquid diet without difficulty. Taking in 40-50oz water daily. Sources of protein include protein shakes. Has not been exercising. Patient is sleeping 6-7 hours a night on average. Bowel movements are regular. The patient is not having any pain. . The patient is compliant with multivitamins. Surgery related complications: none. Liver bx report reviewed with patient. Stomach bx report reviewed with patient. Weight Loss Metrics 12/19/2019 12/6/2019 12/2/2019 12/2/2019 11/27/2019 5/16/2019 4/18/2019   Today's Wt 226 lb 258 lb 2.5 oz - 240 lb 12.8 oz 250 lb 238 lb 236 lb   BMI 35.4 kg/m2 40.43 kg/m2 37.71 kg/m2 37.71 kg/m2 39.16 kg/m2 37.28 kg/m2 36.96 kg/m2          Comorbidities:    Hypertension: not applicable  Diabetes: not applicable  Obstructive Sleep Apnea: unchanged, using CPAP  Hyperlipidemia: not applicable  Stress Urinary Incontinence: not applicable  Gastroesophageal Reflux: not applicable  Weight related arthropathy:improved, states everything hurts     Patient Active Problem List   Diagnosis Code    Severe obesity ([facility] [address] E66.01    Obstructive sleep apnea G47.33    Edema R60.9    Elevated sedimentation rate R70.0    Elevated liver enzymes R74.8    Severe obesity (BMI 35.0-39. 9) with comorbidity ([address] E66.01    Diverticulitis K57.92    Intestinal malabsorption K90.9    S/P gastric bypass Z98.84    Steatosis of liver K76.0        Past Medical History:   Diagnosis Date    Arthritic-like pain     Diverticulitis     Hospitalized April 2019 [address]    Edema     Elevated liver enzymes     Elevated sedimentation rate     Intestinal malabsorption 12/12/2019    Obstructive sleep apnea     Uses CPAP, Instructed to bring DOS    S/P gastric bypass 12/12/2019 12/06/19 by Dr. Baldemar Gonzalez Severe obesity (BMI 35.0-39. 9) with comorbidity ([facility] [address]     Steatosis of liver 12/12/2019       Past Surgical History:   Procedure Laterality Date    HX ORTHOPAEDIC Right 2005    skin graft right first digit after trauma       Current Outpatient Medications   Medication Sig Dispense Refill    B.infantis-B.ani-B.long-B.bifi (PROBIOTIC 4X) 10-15 mg TbEC Take  by mouth.  multivitamin with iron (FLINTSTONES) chewable tablet Take 1 Tab by mouth daily.  ursodiol (SHANON FORTE) 500 mg tablet Take 1 Tab by mouth daily for 30 days. 30 Tab 4    ondansetron (ZOFRAN ODT) 4 mg disintegrating tablet Take 1 Tab by mouth every eight (8) hours as needed for Nausea. 30 Tab 1    omeprazole (PRILOSEC) 20 mg capsule Take 1 Cap by mouth daily.  30 Cap 2       No Known Allergies    Review of Systems:  General - No history or complaints of unexpected fever or chills  Head/Neck - No history or complaints of headache or dizziness  Cardiac - No history or complaints of chest pain, palpitations, or shortness of breath  Pulmonary - No history or complaints of shortness of breath or productive cough  Gastrointestinal - as noted above  Genitourinary - No history or complaints of hematuria/dysuria or renal lithiasis  Musculoskeletal - No history or complaints of joint  muscular weakness  Hematologic - No history of any bleeding episodes  Neurologic - No history or complaints of  migraine headaches or neurologic symptoms    Objective:     Visit Vitals  /84 (BP 1 Location: Left arm, BP Patient Position: Sitting)   Pulse 88   Temp 97.8 °F (36.6 °C)   Ht 5' 7\" (1.702 m)   Wt 102.5 kg (226 lb)   SpO2 99%   BMI 35.40 kg/m²       General:  alert, cooperative, no distress, appears stated age   Chest: lungs clear to auscultation, breath sounds equal and symmetric, no rhonchi, rales or wheezes, no accessory muscle use   Cor:   Regular rate and rhythm or without murmur or extra heart sounds Abdomen: soft, bowel sounds active, non-tender, no masses or organomegaly   Incisions:   healing well, no drainage, no erythema, no hernia, no seroma, no swelling, no dehiscence, incision well approximated     Recent Results (from the past 2016 hour(s))   EKG, 12 LEAD, INITIAL    Collection Time: 12/02/19 12:25 PM   Result Value Ref Range    Ventricular Rate 62 BPM    Atrial Rate 62 BPM    P-R Interval 144 ms    QRS Duration 82 ms    Q-T Interval 420 ms    QTC Calculation (Bezet) 426 ms    Calculated P Axis 62 degrees    Calculated R Axis 38 degrees    Calculated T Axis 48 degrees    Diagnosis       Normal sinus rhythm  Normal ECG  No previous ECGs available  Confirmed by Esteban Mulligan MD. ([id number]) on 12/2/2019 11:38:05 PM     CBC WITH AUTOMATED DIFF    Collection Time: 12/02/19 12:31 PM   Result Value Ref Range    WBC 6.8 4.6 - 13.2 K/uL    RBC 4.23 4.20 - 5.30 M/uL    HGB 13.0 12.0 - 16.0 g/dL    HCT 39.8 35.0 - 45.0 %    MCV 94.1 74.0 - 97.0 FL    MCH 30.7 24.0 - 34.0 PG    MCHC 32.7 31.0 - 37.0 g/dL    RDW 12.8 11.6 - 14.5 %    PLATELET 846 703 - 946 K/uL    MPV 11.3 9.2 - 11.8 FL    NEUTROPHILS 65 40 - 73 %    LYMPHOCYTES 26 21 - 52 %    MONOCYTES 7 3 - 10 %    EOSINOPHILS 2 0 - 5 %    BASOPHILS 0 0 - 2 %    ABS. NEUTROPHILS 4.4 1.8 - 8.0 K/UL    ABS. LYMPHOCYTES 1.8 0.9 - 3.6 K/UL    ABS. MONOCYTES 0.5 0.05 - 1.2 K/UL    ABS. EOSINOPHILS 0.1 0.0 - 0.4 K/UL    ABS.  BASOPHILS 0.0 0.0 - 0.1 K/UL    DF AUTOMATED     METABOLIC PANEL, COMPREHENSIVE    Collection Time: 12/02/19 12:31 PM   Result Value Ref Range    Sodium 139 136 - 145 mmol/L    Potassium 4.2 3.5 - 5.5 mmol/L    Chloride 105 100 - 111 mmol/L    CO2 26 21 - 32 mmol/L    Anion gap 8 3.0 - 18 mmol/L    Glucose 81 74 - 99 mg/dL    BUN 11 7.0 - 18 MG/DL    Creatinine 0.78 0.6 - 1.3 MG/DL    BUN/Creatinine ratio 14 12 - 20      GFR est AA >60 >60 ml/min/1.73m2    GFR est non-AA >60 >60 ml/min/1.73m2    Calcium 9.4 8.5 - 10.1 MG/DL    Bilirubin, total 1.1 (H) 0.2 - 1.0 MG/DL    ALT (SGPT) 70 (H) 13 - 56 U/L    AST (SGOT) 49 (H) 10 - 38 U/L    Alk. phosphatase 115 45 - 117 U/L    Protein, total 7.4 6.4 - 8.2 g/dL    Albumin 3.9 3.4 - 5.0 g/dL    Globulin 3.5 2.0 - 4.0 g/dL    A-G Ratio 1.1 0.8 - 1.7     HCG QL SERUM    Collection Time: 12/02/19 12:31 PM   Result Value Ref Range    HCG, Ql. NEGATIVE  NEG     TYPE & SCREEN    Collection Time: 12/02/19 12:31 PM   Result Value Ref Range    Crossmatch Expiration 12/09/2019     ABO/Rh(D) O NEGATIVE     Antibody screen NEG        Pathology:  A: GASTRIC CARDIA, BIOPSIES:   BENIGN GASTRIC MUCOSA. HELICOBACTER-LIKE ORGANISMS ARE NOT IDENTIFIED. B: LIVER, WEDGE BIOPSY:   MARKED STEATOSIS. NO EVIDENCE OF STEATOHEPATITIS, FIBROSIS OR CIRRHOSIS. Assessment:   History of Morbid obesity, status post laparoscopic gastric bypass surgery. Doing well postoperatively. Marked steatosis of the liver with elevated liver enzymes - referral to hepatology, Dr. Lashawn Peterson  Increase fluid intake to >64oz daily or more of sugar free and caffeine free fluids. Exercise a minimum of 30 minutes daily. Sleep goal is 7-9 hours each night. Patient education given on the effects of sleep deprivation on weight control. GABRIELLA - continue CPAP    Plan:     1. Increase activity to the goal of 30 minutes daily  2. Advance diet to soft solid phase. Reminded to measure portions, continue high protein, low carbohydrate diet. Reminded to eat regularly, to eat slowly & not to drink with meals. Refer to the handbook given in class. 3. Sleep goal is 7-9 hours each night. Patient education given on the effects of sleep deprivation  4. Continue multivitamin   5. Continue current medications and follow up with PCP for management of regimen. 6. Encouraged to attend support group   7. I have discussed this plan with patient and they verbalized understanding  8.  Follow up in 2 weeks or sooner if patient has questions, concerns or worsening of condition, if unable to reach our office, patient should report to the ED. 5. Ms. Maia Malik has a reminder for a \"due or due soon\" health maintenance. I have asked that she contact her primary care provider for a follow-up on this health maintenance.

## 2020-01-02 ENCOUNTER — OFFICE VISIT (OUTPATIENT)
Dept: SURGERY | Age: 60
End: 2020-01-02

## 2020-01-02 VITALS
SYSTOLIC BLOOD PRESSURE: 98 MMHG | TEMPERATURE: 98.6 F | HEART RATE: 78 BPM | WEIGHT: 218.8 LBS | DIASTOLIC BLOOD PRESSURE: 62 MMHG | OXYGEN SATURATION: 100 % | HEIGHT: 67 IN | BODY MASS INDEX: 34.34 KG/M2

## 2020-01-02 VITALS — BODY MASS INDEX: 34.37 KG/M2 | HEIGHT: 67 IN | WEIGHT: 219 LBS

## 2020-01-02 DIAGNOSIS — Z98.84 S/P BARIATRIC SURGERY: ICD-10-CM

## 2020-01-02 DIAGNOSIS — Z98.84 S/P BARIATRIC SURGERY: Primary | ICD-10-CM

## 2020-01-02 DIAGNOSIS — K90.9 INTESTINAL MALABSORPTION, UNSPECIFIED TYPE: Primary | ICD-10-CM

## 2020-01-02 NOTE — PROGRESS NOTES
Subjective:      Yadira Lozano is a 64 y.o. female is now 1 months status post laparoscopic gastric bypass surgery. Doing well overall. She has lost a total of 22 pounds since surgery. Body mass index is 34.27 kg/m². Has lost 22% of EBW. Currently on a soft food diet without difficulty, reports no issues and denies vomiting and abdominal pain. Taking in 40-50oz water daily. Sources of protein include eggs. 15-20 min of activity 7 days a week, including walking. Bowel movements are alternating constipation and regularity, has stool softener and takes prn. The patient is not having any pain. . The patient is compliant with multivitamins.      Weight Loss Metrics 1/2/2020 1/2/2020 12/19/2019 12/6/2019 12/2/2019 12/2/2019 11/27/2019   Today's Wt 219 lb 218 lb 12.8 oz 226 lb 258 lb 2.5 oz - 240 lb 12.8 oz 250 lb   BMI 34.3 kg/m2 34.27 kg/m2 35.4 kg/m2 40.43 kg/m2 37.71 kg/m2 37.71 kg/m2 39.16 kg/m2          Comorbidities:    Hypertension: not applicable  Diabetes: not applicable  Obstructive Sleep Apnea: unchanged, using CPAP  Hyperlipidemia: not applicable  Stress Urinary Incontinence: not applicable  Gastroesophageal Reflux: not applicable  Weight related arthropathy:improved, states everything hurts     Patient Active Problem List   Diagnosis Code    Severe obesity ([facility] [address] E66.01    Obstructive sleep apnea G47.33    Edema R60.9    Elevated sedimentation rate R70.0    Elevated liver enzymes R74.8    Diverticulitis K57.92    Intestinal malabsorption K90.9    S/P gastric bypass Z98.84    Steatosis of liver K76.0        Past Medical History:   Diagnosis Date    Arthritic-like pain     Diverticulitis     Hospitalized April 2019 [address]    Edema     Elevated liver enzymes     Elevated sedimentation rate     Intestinal malabsorption 12/12/2019    Obstructive sleep apnea     Uses CPAP, Instructed to bring DOS    S/P gastric bypass 12/12/2019 12/06/19 by Dr. Lova Merino Severe obesity (BMI 35.0-39. 9) with comorbidity ([address]     Steatosis of liver 12/12/2019       Past Surgical History:   Procedure Laterality Date    HX ORTHOPAEDIC Right 2005    skin graft right first digit after trauma       Current Outpatient Medications   Medication Sig Dispense Refill    B.infantis-B.ani-B.long-B.bifi (PROBIOTIC 4X) 10-15 mg TbEC Take  by mouth.  multivitamin with iron (FLINTSTONES) chewable tablet Take 1 Tab by mouth daily.  ursodiol (SHANON FORTE) 500 mg tablet Take 1 Tab by mouth daily for 30 days. 30 Tab 4    omeprazole (PRILOSEC) 20 mg capsule Take 1 Cap by mouth daily.  30 Cap 2       No Known Allergies    Review of Systems:  General - No history or complaints of unexpected fever or chills  Head/Neck - No history or complaints of headache or dizziness  Cardiac - No history or complaints of chest pain, palpitations, or shortness of breath  Pulmonary - No history or complaints of shortness of breath or productive cough  Gastrointestinal - as noted above  Genitourinary - No history or complaints of hematuria/dysuria or renal lithiasis  Musculoskeletal - No history or complaints of joint  muscular weakness  Hematologic - No history of any bleeding episodes  Neurologic - No history or complaints of  migraine headaches or neurologic symptoms    Objective:     Visit Vitals  BP 98/62 (BP 1 Location: Left arm, BP Patient Position: Sitting)   Pulse 78   Temp 98.6 °F (37 °C)   Ht 5' 7\" (1.702 m)   Wt 99.2 kg (218 lb 12.8 oz)   SpO2 100%   BMI 34.27 kg/m²       General:  alert, cooperative, no distress, appears stated age   Chest: lungs clear to auscultation, breath sounds equal and symmetric, no rhonchi, rales or wheezes, no accessory muscle use   Cor:   Regular rate and rhythm or without murmur or extra heart sounds   Abdomen: soft, bowel sounds active, non-tender, no masses or organomegaly   Incisions:   healing well, no drainage, no erythema, no hernia, no seroma, no swelling, no dehiscence, incision well approximated       Recent Results (from the past 2016 hour(s))   EKG, 12 LEAD, INITIAL    Collection Time: 12/02/19 12:25 PM   Result Value Ref Range    Ventricular Rate 62 BPM    Atrial Rate 62 BPM    P-R Interval 144 ms    QRS Duration 82 ms    Q-T Interval 420 ms    QTC Calculation (Bezet) 426 ms    Calculated P Axis 62 degrees    Calculated R Axis 38 degrees    Calculated T Axis 48 degrees    Diagnosis       Normal sinus rhythm  Normal ECG  No previous ECGs available  Confirmed by Ulices Valles MD. ([id number]) on 12/2/2019 11:38:05 PM     CBC WITH AUTOMATED DIFF    Collection Time: 12/02/19 12:31 PM   Result Value Ref Range    WBC 6.8 4.6 - 13.2 K/uL    RBC 4.23 4.20 - 5.30 M/uL    HGB 13.0 12.0 - 16.0 g/dL    HCT 39.8 35.0 - 45.0 %    MCV 94.1 74.0 - 97.0 FL    MCH 30.7 24.0 - 34.0 PG    MCHC 32.7 31.0 - 37.0 g/dL    RDW 12.8 11.6 - 14.5 %    PLATELET 593 785 - 278 K/uL    MPV 11.3 9.2 - 11.8 FL    NEUTROPHILS 65 40 - 73 %    LYMPHOCYTES 26 21 - 52 %    MONOCYTES 7 3 - 10 %    EOSINOPHILS 2 0 - 5 %    BASOPHILS 0 0 - 2 %    ABS. NEUTROPHILS 4.4 1.8 - 8.0 K/UL    ABS. LYMPHOCYTES 1.8 0.9 - 3.6 K/UL    ABS. MONOCYTES 0.5 0.05 - 1.2 K/UL    ABS. EOSINOPHILS 0.1 0.0 - 0.4 K/UL    ABS. BASOPHILS 0.0 0.0 - 0.1 K/UL    DF AUTOMATED     METABOLIC PANEL, COMPREHENSIVE    Collection Time: 12/02/19 12:31 PM   Result Value Ref Range    Sodium 139 136 - 145 mmol/L    Potassium 4.2 3.5 - 5.5 mmol/L    Chloride 105 100 - 111 mmol/L    CO2 26 21 - 32 mmol/L    Anion gap 8 3.0 - 18 mmol/L    Glucose 81 74 - 99 mg/dL    BUN 11 7.0 - 18 MG/DL    Creatinine 0.78 0.6 - 1.3 MG/DL    BUN/Creatinine ratio 14 12 - 20      GFR est AA >60 >60 ml/min/1.73m2    GFR est non-AA >60 >60 ml/min/1.73m2    Calcium 9.4 8.5 - 10.1 MG/DL    Bilirubin, total 1.1 (H) 0.2 - 1.0 MG/DL    ALT (SGPT) 70 (H) 13 - 56 U/L    AST (SGOT) 49 (H) 10 - 38 U/L    Alk.  phosphatase 115 45 - 117 U/L    Protein, total 7.4 6.4 - 8.2 g/dL    Albumin 3.9 3.4 - 5.0 g/dL    Globulin 3.5 2.0 - 4.0 g/dL    A-G Ratio 1.1 0.8 - 1.7     HCG QL SERUM    Collection Time: 12/02/19 12:31 PM   Result Value Ref Range    HCG, Ql. NEGATIVE  NEG     TYPE & SCREEN    Collection Time: 12/02/19 12:31 PM   Result Value Ref Range    Crossmatch Expiration 12/09/2019     ABO/Rh(D) O NEGATIVE     Antibody screen NEG        Assessment:   History of Morbid obesity, status post laparoscopic gastric bypass surgery. Doing well postoperatively. Sleep goal is 7-9 hours each night. Patient education given on the effects of sleep deprivation on weight control. Increase fluid intake to >64oz daily or more of sugar free and caffeine free fluids. Exercise a minimum of 30 minutes daily. GABRIELLA - continue CPAP  Constipation - OTC stool softener    Plan:     1. Increase activity to the goal of 30 minutes daily   2. Start 500mg [employer] today, stop after 6 months out from surgery. 3. Pt is cleared to return to work without restrictions. 4. Can stop probiotic    5. Sleep goal is 7-9 hours a night. Patient education given on the effects of sleep deprivation on weight control. 6. Advance diet to solid phase. Reminded to measure portions, continue high protein, low carbohydrate diet. Reminded to eat regularly, to eat slowly & not to drink with meals. Refer to the handbook given in class. 7. Patient has appt with dietician directly after this visit. 8. Continue multivitamin and add the additional vitamin supplementation (Ca, B complex, B12, D, all listed in handbook)  9. Continue current medications and follow up with PCP for management of regimen. 10. Continue cardio exercise and add resistance exercises. Discussed with patient. Minimum of 30 minutes of exercise daily. 11. Encouraged to attend support group   12. I have discussed this plan with patient and they verbalized understanding  13.  Follow up in 1 months or sooner if patient has questions, concerns or worsening of condition, if unable to reach our office, patient should report to the ED. 15. Ms. William Heaton has a reminder for a \"due or due soon\" health maintenance. I have asked that she contact her primary care provider for a follow-up on this health maintenance.

## 2020-01-02 NOTE — PROGRESS NOTES
1. Have you been to the ER, urgent care clinic since your last visit? Hospitalized since your last visit? No    2. Have you seen or consulted any other health care providers outside of the [address] [place] [address] since your last visit? Include any pap smears or colon screening.  No

## 2020-01-02 NOTE — PROGRESS NOTES
Pt given one on one diet education. Appears to have a good understanding of the diet progression, food choices, and dietary/exercise habits for successful weight loss and nourishment one month after surgery. The  material included: post-op diet progression and portion sizes (including low fat, low sugar food recommendations and emphasis on protein foods and protein supplements), good beverage choices, reading a food label, vitamins/minerals required after weight loss surgery, and encouraging dietary and exercise habits that lead to weight loss success. Pt also received a restaurant card, which tells restaurants that the patient had a procedure that decreases the size of their stomach so the restaurant may let them order off the children's menu, the senior's menu, or a smaller portion for a reduced rate.      Sandeep Martin        Patient states she's getting around 48-53 ounces of fluid a day (this includes her protein shakes)

## 2020-01-02 NOTE — PATIENT INSTRUCTIONS
Patient Instructions      1. Remember hydration goals - minimum of 64 ounces of liquids per day (dehydration is the number one reason for hospital readmission). 2. Sleep 7-9 hours each night to keep your metabolism up. 3. Continue to monitor carbohydrate and protein intake you need a minimum of  Grams of protein daily- remember to keep your total carbohydrates to 50 grams or less per day for best results. 4. To maximize weight loss keep your caloric intake between 800-1,200 calories daily. If you are exercising excessively, such as training for a marathon, you need to keep a food log and meet with the dietician so they can advise you on your diet choices, carbohydrate intake and caloric intake. 5. Continue to work towards exercise goals - 60-90 minutes, 5 times a week minimum of deliberate, aerobic exercise is the ultimate goal with strength training 2 times each week. Refer to [employer] for  information. 6. Remember to take vitamins as directed in your handbook. 7. Attend support group the 2nd Thursday of each month. 8. Constipation: Milk of Magnesia is for immediate relief only. Miralax is to be used every day if constipation is a chronic problem. 9. Diarrhea: patients will occasionally develop lactose intolerance after surgery. Check to see if your protein shake has whey in it. If it does try a protein powder or drink that does not have whey and stop all yogurts, cheeses and milks to see if the diarrhea goes away. 10. If you have had labs drawn. We will only call you if you have abnormal results. Otherwise you can access the lab results in \"[email or web address]\". You will only need the access code the first time you sign on. 11. Call us at [phone] or email us through [facility] with questions,     concerns or worsening of condition, we have someone on call 24 hours a day.   If you are unable to reach our office, you are to go to your Primary Care Physician or the Emergency Department. NOTE TO GASTRIC BYPASS PATIENTS:  (SAME APPLIES TO GASTRIC SLEEVE PATIENTS FOR FIRST TWO MONTHS)  Remember that for the rest of your life, you are not able to take the following:  - NSAIDs (ibuprofen, goody powder, BC powder, Motrin, Advil, Mobic, Voltaren, Excedrin, etc.)  - Steroid pills or injections  - Smoke (cigarettes or recreational drugs)  - Alcohol  Use of any of the above may cause ulcers in your stomach which may perforate causing a medical emergency and surgery. Speak to our medical staff if another medical provider requires you to take steroids or NSAIDs. Supplement Resource Guide    Importance of Protein:   Maintains lean body mass, produces antibodies to fight off infections, heals wounds, minimizes hair loss, helps to give you energy, helps with satiety, and keeping you full between meals. Importance of Calcium:  Needed for healthy bones and teeth, normal blood clotting, and nervous system functioning, higher risk of osteoporosis and bone disease with non-compliance. Importance of Multivitamins: Many functions. Supply you with extra nutrients that you may be missing from food. May lead to iron deficiency anemia, weakness, fatigue, and many other symptoms with non-compliance. Importance of B Vitamins:  Important for red blood cell formation, metabolism, energy, and helps to maintain a healthy nervous system. Protein Supplement  Liquid diet phase: consume 90-100g protein daily. Once you are eating consume  35-50g protein each day from your protein supplement. 0-3 g fat per serving  0-3 g sugar per serving    The body can only absorb 30g of protein at one time, so do not consume more than that at one time. Multi-vitamin Supplement:    Start immediately after surgery: any complete chewable, such as: [employer]s Complete chewables. Avoid [employer] sours or gummies.   They lack iron and other important nutrients and also have added sugar. Continue with a chewable vitamin or change to an adult complete multivitamin one month after surgery. Menstruating women can take a prenatal vitamin. Make sure it has at least 18 mg iron and 916-752 mcg folic acid Calcium Supplement:     Start taking within one month after surgery. Look for:   Calcium Citrate Plus D (1500 mg per day)    Recommend: Citracal    Avoid chocolate chewable calcium. Can use chewable bariatric or GNC brand or similar chewable. The body cannot absorb more than 500-600 mg of calcium at one time. Take for Life    Vitamin D  Take 3,000 international units daily Vitamin B12  B Complex Vitamin  Start taking both within one month after surgery. Vitamin B12 (sublingual): Take 1000 mcg of Vitamin B12 three times weekly    Must take sublingually (meaning you put it under your tongue) or in a liquid drop form for easy absorption. B Complex Vitamin:   Take one pill daily or liquid drop form daily; as directed on bottle.      Take for Life

## 2020-01-29 ENCOUNTER — TELEPHONE (OUTPATIENT)
Dept: SURGERY | Age: 60
End: 2020-01-29

## 2020-01-29 NOTE — TELEPHONE ENCOUNTER
Patient contacted this RN re: Dion Allen, as she could not remember when she was supposed to start taking it. As per her progress note on 1/2/20, she was supposed to begin that day. RN told her to begin taking it today and discontinue six months from today's date since she is getting a later start. She verbalized understanding.

## 2020-02-06 ENCOUNTER — OFFICE VISIT (OUTPATIENT)
Dept: SURGERY | Age: 60
End: 2020-02-06

## 2020-02-06 VITALS
TEMPERATURE: 98.2 F | HEART RATE: 85 BPM | BODY MASS INDEX: 32.18 KG/M2 | DIASTOLIC BLOOD PRESSURE: 63 MMHG | OXYGEN SATURATION: 100 % | SYSTOLIC BLOOD PRESSURE: 103 MMHG | WEIGHT: 205 LBS | HEIGHT: 67 IN

## 2020-02-06 DIAGNOSIS — K90.9 INTESTINAL MALABSORPTION, UNSPECIFIED TYPE: Primary | ICD-10-CM

## 2020-02-06 DIAGNOSIS — Z98.84 S/P BARIATRIC SURGERY: ICD-10-CM

## 2020-02-06 RX ORDER — LANOLIN ALCOHOL/MO/W.PET/CERES
1 CREAM (GRAM) TOPICAL
COMMUNITY
End: 2021-01-27

## 2020-02-06 RX ORDER — LANOLIN ALCOHOL/MO/W.PET/CERES
1000 CREAM (GRAM) TOPICAL DAILY
COMMUNITY

## 2020-02-06 RX ORDER — URSODIOL 300 MG/1
300 CAPSULE ORAL 2 TIMES DAILY
COMMUNITY
End: 2020-09-17 | Stop reason: ALTCHOICE

## 2020-02-06 NOTE — PROGRESS NOTES
1. Have you been to the ER, urgent care clinic since your last visit? Hospitalized since your last visit? No    2. Have you seen or consulted any other health care providers outside of the [address] [place] [address] since your last visit? Include any pap smears or colon screening.  No        Chief Complaint   Patient presents with    Follow-up

## 2020-02-06 NOTE — PATIENT INSTRUCTIONS
Patient Instructions      1. Remember hydration goals - minimum of 64 ounces of liquids per day (dehydration is the number one reason for hospital readmission). 2. Sleep 7-9 hours each night to keep your metabolism up. 3. Continue to monitor carbohydrate and protein intake you need a minimum of  Grams of protein daily- remember to keep your total carbohydrates to 50 grams or less per day for best results. 4. To maximize weight loss keep your caloric intake between 800-1,200 calories daily. If you are exercising excessively, such as training for a marathon, you need to keep a food log and meet with the dietician so they can advise you on your diet choices, carbohydrate intake and caloric intake. 5. Continue to work towards exercise goals - 60-90 minutes, 5 times a week minimum of deliberate, aerobic exercise is the ultimate goal with strength training 2 times each week. Refer to [employer] for  information. 6. Remember to take vitamins as directed in your handbook. 7. Attend support group the 2nd Thursday of each month. 8. Constipation: Milk of Magnesia is for immediate relief only. Miralax is to be used every day if constipation is a chronic problem. 9. Diarrhea: patients will occasionally develop lactose intolerance after surgery. Check to see if your protein shake has whey in it. If it does try a protein powder or drink that does not have whey and stop all yogurts, cheeses and milks to see if the diarrhea goes away. 10. If you have had labs drawn. We will only call you if you have abnormal results. Otherwise you can access the lab results in \"[email or web address]\". You will only need the access code the first time you sign on. 11. Call us at [phone] or email us through [facility] [place] [facility]\""[facility]" with questions,     concerns or worsening of condition, we have someone on call 24 hours a day.   If you are unable to reach our office, you are to go to your Primary Care Physician or the Emergency Department. NOTE TO GASTRIC BYPASS PATIENTS:  (SAME APPLIES TO GASTRIC SLEEVE PATIENTS FOR FIRST TWO MONTHS)  Remember that for the rest of your life, you are not able to take the following:  - NSAIDs (ibuprofen, goody powder, BC powder, Motrin, Advil, Mobic, Voltaren, Excedrin, etc.)  - Steroid pills or injections  - Smoke (cigarettes or recreational drugs)  - Alcohol  Use of any of the above may cause ulcers in your stomach which may perforate causing a medical emergency and surgery. Speak to our medical staff if another medical provider requires you to take steroids or NSAIDs. Supplement Resource Guide    Importance of Protein:   Maintains lean body mass, produces antibodies to fight off infections, heals wounds, minimizes hair loss, helps to give you energy, helps with satiety, and keeping you full between meals. Importance of Calcium:  Needed for healthy bones and teeth, normal blood clotting, and nervous system functioning, higher risk of osteoporosis and bone disease with non-compliance. Importance of Multivitamins: Many functions. Supply you with extra nutrients that you may be missing from food. May lead to iron deficiency anemia, weakness, fatigue, and many other symptoms with non-compliance. Importance of B Vitamins:  Important for red blood cell formation, metabolism, energy, and helps to maintain a healthy nervous system. Protein Supplement  Liquid diet phase: consume 90-100g protein daily. Once you are eating consume  35-50g protein each day from your protein supplement. 0-3 g fat per serving  0-3 g sugar per serving    The body can only absorb 30g of protein at one time, so do not consume more than that at one time. Multi-vitamin Supplement:    Start immediately after surgery: any complete chewable, such as: [employer]s Complete chewables. Avoid [employer] sours or gummies.   They lack iron and other important nutrients and also have added sugar. Continue with a chewable vitamin or change to an adult complete multivitamin one month after surgery. Menstruating women can take a prenatal vitamin. Make sure it has at least 18 mg iron and 336-386 mcg folic acid Calcium Supplement:     Start taking within one month after surgery. Look for:   Calcium Citrate Plus D (1500 mg per day)    Recommend: Citracal    Avoid chocolate chewable calcium. Can use chewable bariatric or GNC brand or similar chewable. The body cannot absorb more than 500-600 mg of calcium at one time. Take for Life    Vitamin D  Take 3,000 international units daily Vitamin B12  B Complex Vitamin  Start taking both within one month after surgery. Vitamin B12 (sublingual): Take 1000 mcg of Vitamin B12 three times weekly    Must take sublingually (meaning you put it under your tongue) or in a liquid drop form for easy absorption. B Complex Vitamin:   Take one pill daily or liquid drop form daily; as directed on bottle.      Take for Life

## 2020-02-06 NOTE — PROGRESS NOTES
Subjective:      Kelsey Hyde is a 64 y.o. female is now 2 month status post laparoscopic gastric bypass surgery. Doing well overall. She has lost a total of 36 pounds since surgery. Body mass index is 32.11 kg/m². Has lost 36% of EBW. Currently on a solid food diet without difficulty, reports no issues and denies vomiting and abdominal pain. Taking in 40-50oz water daily. Sources of protein include chicken, shrimp and  yogurt. She is walking 1 mile 2-3 days a week. Patient is sleeping 9-10 hours a night on average. She has been having broken sleep due to flushing. Bowel movements are alternating constipation and regularity. The patient is not having any pain. . The patient is compliant with multivitamins, calcium, Vit D and B12 supplements.      Weight Loss Metrics 2/6/2020 1/2/2020 1/2/2020 12/19/2019 12/6/2019 12/2/2019 12/2/2019   Today's Wt 205 lb 219 lb 218 lb 12.8 oz 226 lb 258 lb 2.5 oz - 240 lb 12.8 oz   BMI 32.11 kg/m2 34.3 kg/m2 34.27 kg/m2 35.4 kg/m2 40.43 kg/m2 37.71 kg/m2 37.71 kg/m2          Comorbidities:    Hypertension: not applicable  Diabetes: not applicable  Obstructive Sleep Apnea: unchanged, using CPAP sporadically, states numbers on sandrine are good  Hyperlipidemia: not applicable  Stress Urinary Incontinence: not applicable  Gastroesophageal Reflux: not applicable  Weight related arthropathy: resolved          Patient Active Problem List   Diagnosis Code    Severe obesity ([facility] [address] E66.01    Obstructive sleep apnea G47.33    Edema R60.9    Elevated sedimentation rate R70.0    Elevated liver enzymes R74.8    Diverticulitis K57.92    Intestinal malabsorption K90.9    S/P gastric bypass Z98.84    Steatosis of liver K76.0        Past Medical History:   Diagnosis Date    Arthritic-like pain     Diverticulitis     Hospitalized April 2019 [address]    Edema     Elevated liver enzymes     Elevated sedimentation rate     Intestinal malabsorption 12/12/2019    Obstructive sleep apnea     Uses CPAP, Instructed to bring DOS    S/P gastric bypass 12/12/2019 12/06/19 by Dr. Diamond Dickerson Severe obesity (BMI 35.0-39. 9) with comorbidity ([address]     Steatosis of liver 12/12/2019       Past Surgical History:   Procedure Laterality Date    HX ORTHOPAEDIC Right 2005    skin graft right first digit after trauma       Current Outpatient Medications   Medication Sig Dispense Refill    ursodiol (ACTIGALL) 300 mg capsule Take 300 mg by mouth two (2) times a day.  cyanocobalamin 1,000 mcg tablet Take 1,000 mcg by mouth daily.  calcium citrate-vitamin d3 (CITRACAL+D) 315-200 mg-unit tab Take 1 Tab by mouth daily (with breakfast).  B.infantis-B.ani-B.long-B.bifi (PROBIOTIC 4X) 10-15 mg TbEC Take  by mouth.  multivitamin with iron (FLINTSTONES) chewable tablet Take 1 Tab by mouth daily.  omeprazole (PRILOSEC) 20 mg capsule Take 1 Cap by mouth daily.  30 Cap 2       No Known Allergies    Review of Systems:  General - No history or complaints of unexpected fever or chills  Head/Neck - No history or complaints of headache or dizziness  Cardiac - No history or complaints of chest pain, palpitations, or shortness of breath  Pulmonary - No history or complaints of shortness of breath or productive cough  Gastrointestinal - as noted above  Genitourinary - No history or complaints of hematuria/dysuria or renal lithiasis  Musculoskeletal - No history or complaints of joint  muscular weakness  Hematologic - No history of any bleeding episodes  Neurologic - No history or complaints of  migraine headaches or neurologic symptoms    Objective:     Visit Vitals  /63 (BP 1 Location: Left arm, BP Patient Position: Sitting)   Pulse 85   Temp 98.2 °F (36.8 °C)   Ht 5' 7\" (1.702 m)   Wt 93 kg (205 lb)   SpO2 100%   BMI 32.11 kg/m²       General:  alert, cooperative, no distress, appears stated age   Chest: lungs clear to auscultation, breath sounds equal and symmetric, no rhonchi, rales or wheezes, no accessory muscle use   Cor:   Regular rate and rhythm or without murmur or extra heart sounds   Abdomen: soft, bowel sounds active, non-tender, no masses or organomegaly   Incisions:   healed well       Assessment:   History of Morbid obesity, status post laparoscopic gastric bypass surgery. Doing well postoperatively. Increase fluid intake to >64oz daily or more of sugar free and caffeine free fluids. Exercise a minimum of 30 minutes daily. GABRIELLA - continue CPAP and f/u with pulmonology  Elevated liver enzymes - f/u hepatology    Plan:     1. Increase activity to the goal of 30 minutes daily  2. Discussed patients weight loss goals and dietary choices in relation to goals. 3. Sleep goal is 7-9 hours each night. Patient education given on the effects of sleep deprivation  4. Reminded to measure portions, continue high protein, low carbohydrate diet. Reminded to eat regularly, to eat slowly & not to drink with meals. 5. Continue vitamin supplementation  6. Continue current medications and follow up with PCP for management of regimen. 7. Continue cardio exercise and add resistance exercises. 60-90 minutes of aerobic activity 5 days a week and strength training 2 days each week. 8. Encouraged to attend support group   9. I have discussed this plan with patient and they verbalized understanding  10. Follow up in 2 months or sooner if patient has questions, concerns or worsening of condition, if unable to reach our office, patient should report to the ED. 6. Ms. Laina Neff has a reminder for a \"due or due soon\" health maintenance. I have asked that she contact her primary care provider for a follow-up on this health maintenance.

## 2020-04-07 ENCOUNTER — OFFICE VISIT (OUTPATIENT)
Dept: SURGERY | Age: 60
End: 2020-04-07

## 2020-04-07 VITALS — HEIGHT: 67 IN | BODY MASS INDEX: 28.56 KG/M2 | WEIGHT: 182 LBS | RESPIRATION RATE: 16 BRPM

## 2020-04-07 DIAGNOSIS — K90.9 INTESTINAL MALABSORPTION, UNSPECIFIED TYPE: Primary | ICD-10-CM

## 2020-04-07 PROBLEM — E66.01 SEVERE OBESITY (HCC): Status: RESOLVED | Noted: 2019-01-07 | Resolved: 2020-04-07

## 2020-04-07 NOTE — PROGRESS NOTES
Subjective:     Beti Chang  is a 64 y.o. female who presents for follow-up about 4 months following laparoscopic gastric bypass surgery. She has lost a total of 60 pounds since surgery. Body mass index is 28.51 kg/m². . EBWL is (60%). The patient presents today to assess their progress toward their goal of weight loss and to address any issues that may be present. Today the patient and I have reviewed their diet and how appropriate their food choices are. The following issues have been identified :no new issues and can tolerate a large variety of food. No emesis noted. Exercising on a regular basis. She has expressed a minimal amount of concern regarding her total protein intake after I have inquired as to such. Weight Loss Metrics 4/7/2020 2/6/2020 1/2/2020 1/2/2020 12/19/2019 12/6/2019 12/2/2019   Today's Wt 182 lb 205 lb 219 lb 218 lb 12.8 oz 226 lb 258 lb 2.5 oz -   BMI 28.51 kg/m2 32.11 kg/m2 34.3 kg/m2 34.27 kg/m2 35.4 kg/m2 40.43 kg/m2 37.71 kg/m2     . Surgery related complication: none       She reports no issues and denies vomiting and abdominal pain. The patients diet choices have been reviewed today and are good. Patients pain score:0    The patient's exercise level: very active or exercises 5 times a week. Changes in her medical history and medications have been reviewed.     Patient Active Problem List   Diagnosis Code    Edema R60.9    Elevated sedimentation rate R70.0    Elevated liver enzymes R74.8    Diverticulitis K57.92    Intestinal malabsorption K90.9    S/P gastric bypass Z98.84    Steatosis of liver K76.0     Past Medical History:   Diagnosis Date    Arthritic-like pain     Diverticulitis     Hospitalized April 2019 [address]    Edema     Elevated liver enzymes     Elevated sedimentation rate     Intestinal malabsorption 12/12/2019    Obstructive sleep apnea     Uses CPAP, Instructed to bring DOS    S/P gastric bypass 12/12/2019 12/06/19 by Dr. Charley Serrato Severe obesity (BMI 35.0-39. 9) with comorbidity ([address]     Steatosis of liver 12/12/2019     Past Surgical History:   Procedure Laterality Date    HX ORTHOPAEDIC Right 2005    skin graft right first digit after trauma     Current Outpatient Medications   Medication Sig Dispense Refill    ursodiol (ACTIGALL) 300 mg capsule Take 300 mg by mouth two (2) times a day.  cyanocobalamin 1,000 mcg tablet Take 1,000 mcg by mouth daily.  calcium citrate-vitamin d3 (CITRACAL+D) 315-200 mg-unit tab Take 1 Tab by mouth daily (with breakfast).  B.infantis-B.ani-B.long-B.bifi (PROBIOTIC 4X) 10-15 mg TbEC Take  by mouth.  multivitamin with iron (FLINTSTONES) chewable tablet Take 1 Tab by mouth daily.           Review of Symptoms:       General - No history or complaints of unexpected fever or chills  Head/Neck - No history or complaints of headache or dizziness  Cardiac - No history or complaints of chest pain, palpitations, or shortness of breath  Pulmonary - No history or complaints of shortness of breath or productive cough  Gastrointestinal - as noted above  Genitourinary - No history or complaints of hematuria/dysuria or renal lithiasis  Musculoskeletal - No history or complaints of joint  muscular weakness  Hematologic - No history of any bleeding episodes  Neurologic - No history or complaints of  migraine headaches or neurologic symptoms                     Objective:     Visit Vitals  Resp 16   Ht 5' 7\" (1.702 m)   Wt 82.6 kg (182 lb)   BMI 28.51 kg/m²        Physical Exam:      Physical Examination: General appearance - alert, well appearing, and in no distress and oriented to person, place, and time  Mental status - alert, oriented to person, place, and time, normal mood, behavior, speech, dress, motor activity, and thought processes  Eyes - pupils equal and reactive, extraocular eye movements intact, sclera anicteric, left eye normal, right eye normal  Ears - right ear normal, left ear normal  Neck- good extension and flexion, no obvious swelling  Chest - good air movement  Heart - N/A  Abdomen - no obvious distension, scars as noted. Neurological - alert, oriented, normal speech, no focal findings or movement disorder noted  Musculoskeletal - no swelling noted  Extremities - normal movement     Assessment:     1. History of Morbid obesity, status post  laparoscopic gastric bypass surgery. Doing well, no concerns. Patient and I have discussed how well she is doing overall from the standpoint of her weight loss. I have inquired from her as to whether or not she feels like she is meeting her protein requirements and she does feel like she is slightly low on her daily intake. I have calculated her lean body weight at approximately 70 kg and this would mean that she needs somewhere in the range of 60 to 80 g of protein per day based on her lean body mass. She states that she will try to take in some additional supplements to help facilitate that. She does understand that in the absence of adequate protein intake that she may have a catabolic effect from the standpoint of her muscle mass and she will try to avoid that by consuming the appropriate protein sources. She is going to be training for a half marathon sometime this summer and I particularly discussed with her her metabolic needs during stress full training times. Plan:     1. Remember to measure portions, continue low carbohydrate diet  2. Continue to concentrate on protein intake meeting daily requirements  3. Remember vitamin supplements. The importance of such was discussed regarding the malabsorptive issues that the surgery creates. 4. Exercise regimen appears to be:   5. Try and attend support group if feasible. 6. Follow-up in 2 month(s). 7. Lab ordered for next visit. 8. Total time spent with the patient 30 minutes.

## 2020-04-07 NOTE — PATIENT INSTRUCTIONS

## 2020-06-16 ENCOUNTER — HOSPITAL ENCOUNTER (OUTPATIENT)
Dept: LAB | Age: 60
Discharge: HOME | End: 2020-06-16
Payer: COMMERCIAL

## 2020-06-16 DIAGNOSIS — K90.9 INTESTINAL MALABSORPTION, UNSPECIFIED TYPE: ICD-10-CM

## 2020-06-16 LAB
25(OH)D3 SERPL-MCNC: 54.5 NG/ML (ref 30–100)
ALBUMIN SERPL-MCNC: 3.6 G/DL (ref 3.4–5)
ALBUMIN/GLOB SERPL: 1 {RATIO} (ref 0.8–1.7)
ALP SERPL-CCNC: 100 U/L (ref 45–117)
ALT SERPL-CCNC: 26 U/L (ref 13–56)
ANION GAP SERPL CALC-SCNC: 5 MMOL/L (ref 3–18)
AST SERPL-CCNC: 28 U/L (ref 10–38)
BASOPHILS # BLD: 0 K/UL (ref 0–0.1)
BASOPHILS NFR BLD: 0 % (ref 0–2)
BILIRUB SERPL-MCNC: 0.9 MG/DL (ref 0.2–1)
BUN SERPL-MCNC: 13 MG/DL (ref 7–18)
BUN/CREAT SERPL: 16 (ref 12–20)
CALCIUM SERPL-MCNC: 9.3 MG/DL (ref 8.5–10.1)
CHLORIDE SERPL-SCNC: 107 MMOL/L (ref 100–111)
CO2 SERPL-SCNC: 30 MMOL/L (ref 21–32)
CREAT SERPL-MCNC: 0.83 MG/DL (ref 0.6–1.3)
DIFFERENTIAL METHOD BLD: ABNORMAL
EOSINOPHIL # BLD: 0.1 K/UL (ref 0–0.4)
EOSINOPHIL NFR BLD: 2 % (ref 0–5)
ERYTHROCYTE [DISTWIDTH] IN BLOOD BY AUTOMATED COUNT: 13.9 % (ref 11.6–14.5)
FERRITIN SERPL-MCNC: 220 NG/ML (ref 8–388)
FOLATE SERPL-MCNC: 18 NG/ML (ref 3.1–17.5)
GLOBULIN SER CALC-MCNC: 3.7 G/DL (ref 2–4)
GLUCOSE SERPL-MCNC: 109 MG/DL (ref 74–99)
HCT VFR BLD AUTO: 38.3 % (ref 35–45)
HGB BLD-MCNC: 12 G/DL (ref 12–16)
IRON SERPL-MCNC: 81 UG/DL (ref 50–175)
LYMPHOCYTES # BLD: 1.5 K/UL (ref 0.9–3.6)
LYMPHOCYTES NFR BLD: 22 % (ref 21–52)
MCH RBC QN AUTO: 31.3 PG (ref 24–34)
MCHC RBC AUTO-ENTMCNC: 31.3 G/DL (ref 31–37)
MCV RBC AUTO: 99.7 FL (ref 74–97)
MONOCYTES # BLD: 0.6 K/UL (ref 0.05–1.2)
MONOCYTES NFR BLD: 9 % (ref 3–10)
NEUTS SEG # BLD: 4.3 K/UL (ref 1.8–8)
NEUTS SEG NFR BLD: 67 % (ref 40–73)
PLATELET # BLD AUTO: 237 K/UL (ref 135–420)
PMV BLD AUTO: 12 FL (ref 9.2–11.8)
POTASSIUM SERPL-SCNC: 4.6 MMOL/L (ref 3.5–5.5)
PROT SERPL-MCNC: 7.3 G/DL (ref 6.4–8.2)
RBC # BLD AUTO: 3.84 M/UL (ref 4.2–5.3)
SODIUM SERPL-SCNC: 142 MMOL/L (ref 136–145)
VIT B12 SERPL-MCNC: 1231 PG/ML (ref 211–911)
WBC # BLD AUTO: 6.5 K/UL (ref 4.6–13.2)

## 2020-06-16 PROCEDURE — 36415 COLL VENOUS BLD VENIPUNCTURE: CPT

## 2020-06-16 PROCEDURE — 82728 ASSAY OF FERRITIN: CPT

## 2020-06-16 PROCEDURE — 84425 ASSAY OF VITAMIN B-1: CPT

## 2020-06-16 PROCEDURE — 80053 COMPREHEN METABOLIC PANEL: CPT

## 2020-06-16 PROCEDURE — 83540 ASSAY OF IRON: CPT

## 2020-06-16 PROCEDURE — 85025 COMPLETE CBC W/AUTO DIFF WBC: CPT

## 2020-06-16 PROCEDURE — 82607 VITAMIN B-12: CPT

## 2020-06-16 PROCEDURE — 82306 VITAMIN D 25 HYDROXY: CPT

## 2020-06-17 ENCOUNTER — VIRTUAL VISIT (OUTPATIENT)
Dept: SURGERY | Age: 60
End: 2020-06-17

## 2020-06-17 VITALS — HEIGHT: 67 IN | BODY MASS INDEX: 26.06 KG/M2 | WEIGHT: 166 LBS

## 2020-06-17 DIAGNOSIS — Z98.84 S/P GASTRIC BYPASS: ICD-10-CM

## 2020-06-17 DIAGNOSIS — K90.9 INTESTINAL MALABSORPTION, UNSPECIFIED TYPE: Primary | ICD-10-CM

## 2020-06-17 DIAGNOSIS — L65.9 HAIR LOSS: ICD-10-CM

## 2020-06-17 RX ORDER — GLUCOSAMINE/CHONDR SU A SOD 750-600 MG
TABLET ORAL
COMMUNITY
End: 2020-10-22 | Stop reason: ALTCHOICE

## 2020-06-17 NOTE — PROGRESS NOTES
Subjective:     Tucker Hughes  is a 64 y.o. female who presents for follow-up about 6 months following laparoscopic gastric bypass surgery. Surgery related complication: NA. She has lost a total of 75 pounds since surgery. Body mass index is 26 kg/m². Crystal Mary Jo Loss of EBW 74%. The patient presents today to assess their progress toward their weight loss goal & to address any issues that may be present:   She is tolerating solids without difficulty, reports hair loss and denies vomiting, abdominal pain, diarrhea and nausea. The patients diet choices have been reviewed today and counseling was given. Fluid intake: good      Protein intake: has tired of protein supplements and mainly trying to get 60g of protein through food       Taking vitamins as recommended. The patient's exercise level: very active. Bikes 8-10 miles a day    Changes in her medical history and medications have been reviewed. Patient Active Problem List   Diagnosis Code    Edema R60.9    Elevated sedimentation rate R70.0    Elevated liver enzymes R74.8    Diverticulitis K57.92    Intestinal malabsorption K90.9    S/P gastric bypass Z98.84    Steatosis of liver K76.0     Past Medical History:   Diagnosis Date    Arthritic-like pain     Diverticulitis     Hospitalized April 2019 [facility]    Edema     Elevated liver enzymes     Elevated sedimentation rate     Intestinal malabsorption 12/12/2019    Obstructive sleep apnea     Uses CPAP, Instructed to bring DOS    S/P gastric bypass 12/12/2019 12/06/19 by Dr. Serina Cedeno Severe obesity (BMI 35.0-39. 9) with comorbidity ([address]     Steatosis of liver 12/12/2019     Past Surgical History:   Procedure Laterality Date    HX ORTHOPAEDIC Right 2005    skin graft right first digit after trauma     Current Outpatient Medications   Medication Sig Dispense Refill    Biotin 2,500 mcg cap Take  by mouth.       ursodiol (ACTIGALL) 300 mg capsule Take 300 mg by mouth two (2) times a day.  cyanocobalamin 1,000 mcg tablet Take 1,000 mcg by mouth daily.  calcium citrate-vitamin d3 (CITRACAL+D) 315-200 mg-unit tab Take 1 Tab by mouth daily (with breakfast).  B.infantis-B.ani-B.long-B.bifi (PROBIOTIC 4X) 10-15 mg TbEC Take  by mouth.  multivitamin with iron (FLINTSTONES) chewable tablet Take 1 Tab by mouth daily.          Review of Systems:  General - Denies fatigue, fever, chills  Cardiac - Denies chest pain, palpitations, shortness of breath  Pulmonary - Denies shortness of breath or productive cough  Gastrointestinal - as noted above  Musculoskeletal - Denies joint or muscle weakness, pain, stiffness  Hematologic - Denies abnormal bleeding or bruising  Neurologic - Denies weakness, paralysis, numbness, tingling    Objective:     Visit Vitals  Ht 5' 7\" (1.702 m)   Wt 75.3 kg (166 lb)   BMI 26.00 kg/m²        Physical Exam:    General appearance - well appearing and in no distress  Mental status - alert, oriented to person, place, and time  Pulmonary - normal respiratory effort  Abdomen - no obvious distention  Neurological - normal speech, no focal findings or movement disorder noted  Extremities - normal movement  Musculoskeletal - moving extremities without difficulty  Skin - no rashes, no suspicious skin lesions noted    Labs:     Recent Results (from the past 2016 hour(s))   FERRITIN    Collection Time: 06/16/20  2:44 PM   Result Value Ref Range    Ferritin 220 8 - 902 NG/ML   METABOLIC PANEL, COMPREHENSIVE    Collection Time: 06/16/20  2:44 PM   Result Value Ref Range    Sodium 142 136 - 145 mmol/L    Potassium 4.6 3.5 - 5.5 mmol/L    Chloride 107 100 - 111 mmol/L    CO2 30 21 - 32 mmol/L    Anion gap 5 3.0 - 18 mmol/L    Glucose 109 (H) 74 - 99 mg/dL    BUN 13 7.0 - 18 MG/DL    Creatinine 0.83 0.6 - 1.3 MG/DL    BUN/Creatinine ratio 16 12 - 20      GFR est AA >60 >60 ml/min/1.73m2    GFR est non-AA >60 >60 ml/min/1.73m2    Calcium 9.3 8.5 - 10.1 MG/DL    Bilirubin, total 0.9 0.2 - 1.0 MG/DL    ALT (SGPT) 26 13 - 56 U/L    AST (SGOT) 28 10 - 38 U/L    Alk. phosphatase 100 45 - 117 U/L    Protein, total 7.3 6.4 - 8.2 g/dL    Albumin 3.6 3.4 - 5.0 g/dL    Globulin 3.7 2.0 - 4.0 g/dL    A-G Ratio 1.0 0.8 - 1.7     CBC WITH AUTOMATED DIFF    Collection Time: 06/16/20  2:44 PM   Result Value Ref Range    WBC 6.5 4.6 - 13.2 K/uL    RBC 3.84 (L) 4.20 - 5.30 M/uL    HGB 12.0 12.0 - 16.0 g/dL    HCT 38.3 35.0 - 45.0 %    MCV 99.7 (H) 74.0 - 97.0 FL    MCH 31.3 24.0 - 34.0 PG    MCHC 31.3 31.0 - 37.0 g/dL    RDW 13.9 11.6 - 14.5 %    PLATELET 256 281 - 769 K/uL    MPV 12.0 (H) 9.2 - 11.8 FL    NEUTROPHILS 67 40 - 73 %    LYMPHOCYTES 22 21 - 52 %    MONOCYTES 9 3 - 10 %    EOSINOPHILS 2 0 - 5 %    BASOPHILS 0 0 - 2 %    ABS. NEUTROPHILS 4.3 1.8 - 8.0 K/UL    ABS. LYMPHOCYTES 1.5 0.9 - 3.6 K/UL    ABS. MONOCYTES 0.6 0.05 - 1.2 K/UL    ABS. EOSINOPHILS 0.1 0.0 - 0.4 K/UL    ABS. BASOPHILS 0.0 0.0 - 0.1 K/UL    DF AUTOMATED     VITAMIN B12 & FOLATE    Collection Time: 06/16/20  2:45 PM   Result Value Ref Range    Vitamin B12 1,231 (H) 211 - 911 pg/mL    Folate 18.0 (H) 3.10 - 17.50 ng/mL   IRON    Collection Time: 06/16/20  2:45 PM   Result Value Ref Range    Iron 81 50 - 175 ug/dL   VITAMIN D, 25 HYDROXY    Collection Time: 06/16/20  2:45 PM   Result Value Ref Range    Vitamin D 25-Hydroxy 54.5 30 - 100 ng/mL         Assessment:     1. History of Morbid obesity, status post  laparoscopic gastric bypass surgery. Doing well; no concerns. Reviewed general guidelines and encouraged trying to get 80g of protein daily. Offered dietary services. 2. Hair loss - continue biotin, review recent labs     Plan:       1. Today the patient & I have reviewed their weight loss and their diet - including how appropriate their weight loss and food choices are.  2. To continue focus on hydration. 3. Reminded to measure portions, continue high protein,low carbohydrate diet. Reminded to eat regularly. Pt verbalized understanding of diet. Dietician support offered. 4. Reminded of importance of vitamin supplements. 5. To continue to increase activity. 6. Encouraged to attend support group. 7. To continue current rx. To continue follow-up with PCP. 8. I have discussed the plan and education material with patient. Pt verbalized understanding. 9. Follow-up in 3 month(s). To call if questions/concerns prior to next office visit      Ms. Hoyos has a reminder for a \"due or due soon\" health maintenance. I have asked that she contact her primary care provider for follow-up on this health maintenance. This visit with Ms Demetra Lobo was performed under virtual telemedicine guidelines during the coronavirus (IISOP-51) public health emergency on 6/17/20 in an interactive fashion using [employer]. me. They understand that this telemedicine encounter is a billable service, with coverage determined by their insurance carrier. They are aware that they may receive a bill and have provided verbal consent for this virtual visit. This visit was performed with the patient in their home environment and provider was present at [facility]. I have spent over 30 minutes on this visit  both prior to the visit reviewing the patients chart and with the patient face to face. I have reviewed their medical history, performed a telemedicine physical examination, and discussed the plan of action to date. They understand that they will be asked to come to the office when our office is allowed normal patient interaction, as dictated by public health officials, for a face-to-face visit to rediscuss all of the things we have talked about today.

## 2020-06-17 NOTE — PATIENT INSTRUCTIONS
Patient Instructions 1. Remember hydration goals - minimum of 64 ounces of liquids per day (dehydration is the number one reason for hospital readmission). 2. Continue to monitor carbohydrate and protein intake you need a minimum of  Grams of protein daily- remember to keep your total carbohydrates to 50 grams or less per day for best results. 3. Continue to work towards exercise goals - 60-90 minutes, 5 times a week minimum of deliberate, aerobic exercise is the ultimate goal with strength training 2 times each week. Refer to [facility] for  information. 4. Remember to take vitamins as directed. 5. Attend support group the 2nd Thursday of each month. 6.  Constipation: Milk of Magnesia is for immediate relief only. Miralax is to be used every day if constipation is a chronic problem. 7.  Diarrhea: patients will occasionally develop lactose intolerance after surgery. Check to see if your protein shake has whey in it. If it does try a protein powder or drink that does not have whey and stop all yogurts, cheeses and milks to see if the diarrhea goes away. 8.  If you have had labs drawn. We will only call you if you have abnormal results. Otherwise you can access the lab results in \"mychart\". You will only need the access code the first time you sign on. 9.  Call us at [phone] or email us through [employer]" with questions,     concerns or worsening of condition, we have someone on call 24 hours a day. If you are unable to reach our office, you are to go to your Primary Care Physician or the Emergency Department. NOTE TO GASTRIC BYPASS PATIENTS:  (SAME APPLIES TO GASTRIC SLEEVE PATIENTS FOR FIRST TWO MONTHS) Remember that for the rest of your life, you are not able to take the following: 
- NSAIDs (ibuprofen, goody powder, BC powder, Motrin, Advil, Mobic, Voltaren, Excedrin, etc.) - Steroid pills or injections - Smoke (cigarettes or recreational drugs) - Alcohol Use of any of the above may cause ulcers in your stomach which may perforate causing a medical emergency and surgery. Speak to our medical staff if another medical provider requires you to take steroids or NSAIDs. Supplement Resource Guide Importance of Protein:  
Maintains lean body mass, produces antibodies to fight off infections, heals wounds, minimizes hair loss, helps to give you energy, helps with satiety, and keeping you full between meals. Importance of Calcium: 
Needed for healthy bones and teeth, normal blood clotting, and nervous system functioning, higher risk of osteoporosis and bone disease with non-compliance. Importance of Multivitamins: Many functions. Supply you with extra nutrients that you may be missing from food. May lead to iron deficiency anemia, weakness, fatigue, and many other symptoms with non-compliance. Importance of B Vitamins: 
Important for red blood cell formation, metabolism, energy, and helps to maintain a healthy nervous system. Protein Supplement Find one you like now. Use immediately after surgery. Look for: 
35-50g protein each day from your protein supplement once you reach the progression diet. 0-3 g fat per serving 0-3 g sugar per serving Protein drinks should be split in separate dosages. Recommend: Lifelong 1 year + Calcium Supplement:  
 
Start taking within a month after surgery. Look for: Calcium Citrate Plus D (1500 mg per day) Recommend: Citracal 
 
 . Avoid chocolate chewable calcium. Can use chewable bariatric or [employer] brand or similar chewable. The body cannot absorb more than 500-600 mg of calcium at a time. Take for Life Multi-vitamin Supplement:   
 
Start immediately after surgery: any complete chewable, such as: [place]s Complete chewables. Avoid [place] sours or gummies. They lack iron and other important nutrients and also have added sugar. Continue with chewable vitamin or change to adult complete multivitamin one month after surgery. Menstruating women can take a prenatal vitamin. Make sure has at least 18 mg iron and 404-543 mcg folic acid Vitamin B12, B Complex Vitamin, and Biotin Start taking within a month after surgery. Vitamin B12:  1000 mcg of Vitamin B12 three times weekly Must take sublingually (meaning you take it under your tongue) or in a liquid drop form for easy absorption. B Complex Vitamin: Take a pill or liquid drop form once daily. Biotin: This vitamin can help prevent hair loss. Recommend 5mg  
(5000 mcg) a day Biotin is Optional  
 
 
 
 
  
Learning About Physical Activity What is physical activity? Physical activity is any kind of activity that gets your body moving. The types of physical activity that can help you get fit and stay healthy include: · Aerobic or \"cardio\" activities that make your heart beat faster and make you breathe harder, such as brisk walking, riding a bike, or running. Aerobic activities strengthen your heart and lungs and build up your endurance. · Strength training activities that make your muscles work against, or \"resist,\" something, such as lifting weights or doing push-ups. These activities help tone and strengthen your muscles. · Stretches that allow you to move your joints and muscles through their full range of motion. Stretching helps you be more flexible and avoid injury. What are the benefits of physical activity? Being active is one of the best things you can do for your health. It helps you to: · Feel stronger and have more energy to do all the things you like to do. · Focus better at school or work. · Feel, think, and sleep better. · Reach and stay at a healthy weight. · Lose fat and build lean muscle. · Lower your risk for serious health problems. · Keep your bones, muscles, and joints strong. How can you make physical activity part of your life? Get at least 30 minutes of exercise on most days of the week. Walking is a good choice. You also may want to do other activities, such as running, swimming, cycling, or playing tennis or team sports. Pick activities that you likeones that make your heart beat faster, your muscles stronger, and your muscles and joints more flexible. If you find more than one thing you like doing, do them all. You don't have to do the same thing every day. Get your heart pumping every day. Any activity that makes your heart beat faster and keeps it at that rate for a while counts. Here are some great ways to get your heart beating faster: · Go for a brisk walk, run, or bike ride. · Go for a hike or swim. · Go in-line skating. · Play a game of touch football, basketball, or soccer. · Ride a bike. · Play tennis or racquetball. · Climb stairs. Even some household chores can be aerobicjust do them at a faster pace. Vacuuming, raking or mowing the lawn, sweeping the garage, and washing and waxing the car all can help get your heart rate up. Strengthen your muscles during the week. You don't have to lift heavy weights or grow big, bulky muscles to get stronger. Doing a few simple activities that make your muscles work against, or \"resist,\" something can help you get stronger. For example, you can: · Do push-ups or sit-ups, which use your own body weight as resistance. · Lift weights or dumbbells or use stretch bands at home or in a gym or community center. Stretch your muscles often. Stretching will help you as you become more active. It can help you stay flexible, loosen tight muscles, and avoid injury. It can also help improve your balance and posture and can be a great way to relax. Be sure to stretch the muscles you'll be using when you work out.  It's best to warm your muscles slightly before you stretch them. Walk or do some other light aerobic activity for a few minutes, and then start stretching. When you stretch your muscles: · Do it slowly. Stretching is not about going fast or making sudden movements. · Don't push or bounce during a stretch. · Hold each stretch for at least 15 to 30 seconds, if you can. You should feel a stretch in the muscle, but not pain. · Breathe out as you do the stretch. Then breathe in as you hold the stretch. Don't hold your breath. If you're worried about how more activity might affect your health, have a checkup before you start. Follow any special advice your doctor gives you for getting a smart start. Where can you learn more? Go to http://collin-rajan.info/ Enter O414 in the search box to learn more about \"Learning About Physical Activity. \" Current as of: January 16, 2020               Content Version: 12.5 © 2006-2020 [employer]. Care instructions adapted under license by [employer] (which disclaims liability or warranty for this information). If you have questions about a medical condition or this instruction, always ask your healthcare professional. [address] any warranty or liability for your use of this information.

## 2020-06-20 LAB — VIT B1 BLD-SCNC: 103.2 NMOL/L (ref 66.5–200)

## 2020-09-17 ENCOUNTER — VIRTUAL VISIT (OUTPATIENT)
Dept: SURGERY | Age: 60
End: 2020-09-17

## 2020-09-17 VITALS — HEIGHT: 67 IN | WEIGHT: 145.4 LBS | BODY MASS INDEX: 22.82 KG/M2

## 2020-09-17 DIAGNOSIS — Z98.84 S/P GASTRIC BYPASS: ICD-10-CM

## 2020-09-17 DIAGNOSIS — L65.9 HAIR LOSS: ICD-10-CM

## 2020-09-17 DIAGNOSIS — K90.9 INTESTINAL MALABSORPTION, UNSPECIFIED TYPE: Primary | ICD-10-CM

## 2020-09-17 DIAGNOSIS — K76.0 STEATOSIS OF LIVER: ICD-10-CM

## 2020-09-17 NOTE — PATIENT INSTRUCTIONS
Patient Instructions 1. Remember hydration goals - minimum of 64 ounces of liquids per day (dehydration is the number one reason for hospital readmission). 2. Continue to monitor carbohydrate and protein intake you need a minimum of  Grams of protein daily- remember to keep your total carbohydrates to 50 grams or less per day for best results. 3. Continue to work towards exercise goals - 60-90 minutes, 5 times a week minimum of deliberate, aerobic exercise is the ultimate goal with strength training 2 times each week. Refer to [employer] for  information. 4. Remember to take vitamins as directed. 5. Attend support group the 2nd Thursday of each month. 6.  Constipation: Milk of Magnesia is for immediate relief only. Miralax is to be used every day if constipation is a chronic problem. 7.  Diarrhea: patients will occasionally develop lactose intolerance after surgery. Check to see if your protein shake has whey in it. If it does try a protein powder or drink that does not have whey and stop all yogurts, cheeses and milks to see if the diarrhea goes away. 8.  If you have had labs drawn. We will only call you if you have abnormal results. Otherwise you can access the lab results in \"mychart\". You will only need the access code the first time you sign on. 9.  Call us at [phone] or email us through [email or web address]" with questions,     concerns or worsening of condition, we have someone on call 24 hours a day. If you are unable to reach our office, you are to go to your Primary Care Physician or the Emergency Department. NOTE TO GASTRIC BYPASS PATIENTS:  (SAME APPLIES TO GASTRIC SLEEVE PATIENTS FOR FIRST TWO MONTHS) Remember that for the rest of your life, you are not able to take the following: 
- NSAIDs (ibuprofen, goody powder, BC powder, Motrin, Advil, Mobic, Voltaren, Excedrin, etc.) - Steroid pills or injections - Smoke (cigarettes or recreational drugs) - Alcohol Use of any of the above may cause ulcers in your stomach which may perforate causing a medical emergency and surgery. Speak to our medical staff if another medical provider requires you to take steroids or NSAIDs. Supplement Resource Guide Importance of Protein:  
Maintains lean body mass, produces antibodies to fight off infections, heals wounds, minimizes hair loss, helps to give you energy, helps with satiety, and keeping you full between meals. Importance of Calcium: 
Needed for healthy bones and teeth, normal blood clotting, and nervous system functioning, higher risk of osteoporosis and bone disease with non-compliance. Importance of Multivitamins: Many functions. Supply you with extra nutrients that you may be missing from food. May lead to iron deficiency anemia, weakness, fatigue, and many other symptoms with non-compliance. Importance of B Vitamins: 
Important for red blood cell formation, metabolism, energy, and helps to maintain a healthy nervous system. Protein Supplement Find one you like now. Use immediately after surgery. Look for: 
35-50g protein each day from your protein supplement once you reach the progression diet. 0-3 g fat per serving 0-3 g sugar per serving Protein drinks should be split in separate dosages. Recommend: Lifelong 1 year + Calcium Supplement:  
 
Start taking within a month after surgery. Look for: Calcium Citrate Plus D (1500 mg per day) Recommend: Citracal 
 
 . Avoid chocolate chewable calcium. Can use chewable bariatric or [employer] brand or similar chewable. The body cannot absorb more than 500-600 mg of calcium at a time. Take for Life Multi-vitamin Supplement:   
 
Start immediately after surgery: any complete chewable, such as: [place]s Complete chewables. Avoid [place] sours or gummies. They lack iron and other important nutrients and also have added sugar. Continue with chewable vitamin or change to adult complete multivitamin one month after surgery. Menstruating women can take a prenatal vitamin. Make sure has at least 18 mg iron and 704-432 mcg folic acid Vitamin B12, B Complex Vitamin, and Biotin Start taking within a month after surgery. Vitamin B12:  1000 mcg of Vitamin B12 three times weekly Must take sublingually (meaning you take it under your tongue) or in a liquid drop form for easy absorption. B Complex Vitamin: Take a pill or liquid drop form once daily. Biotin: This vitamin can help prevent hair loss. Recommend 5mg  
(5000 mcg) a day Biotin is Optional  
 
 
 
 
  
Learning About Physical Activity What is physical activity? Physical activity is any kind of activity that gets your body moving. The types of physical activity that can help you get fit and stay healthy include: · Aerobic or \"cardio\" activities that make your heart beat faster and make you breathe harder, such as brisk walking, riding a bike, or running. Aerobic activities strengthen your heart and lungs and build up your endurance. · Strength training activities that make your muscles work against, or \"resist,\" something, such as lifting weights or doing push-ups. These activities help tone and strengthen your muscles. · Stretches that allow you to move your joints and muscles through their full range of motion. Stretching helps you be more flexible and avoid injury. What are the benefits of physical activity? Being active is one of the best things you can do for your health. It helps you to: · Feel stronger and have more energy to do all the things you like to do. · Focus better at school or work. · Feel, think, and sleep better. · Reach and stay at a healthy weight. · Lose fat and build lean muscle. · Lower your risk for serious health problems. · Keep your bones, muscles, and joints strong. How can you make physical activity part of your life? Get at least 30 minutes of exercise on most days of the week. Walking is a good choice. You also may want to do other activities, such as running, swimming, cycling, or playing tennis or team sports. Pick activities that you likeones that make your heart beat faster, your muscles stronger, and your muscles and joints more flexible. If you find more than one thing you like doing, do them all. You don't have to do the same thing every day. Get your heart pumping every day. Any activity that makes your heart beat faster and keeps it at that rate for a while counts. Here are some great ways to get your heart beating faster: · Go for a brisk walk, run, or bike ride. · Go for a hike or swim. · Go in-line skating. · Play a game of touch football, basketball, or soccer. · Ride a bike. · Play tennis or racquetball. · Climb stairs. Even some household chores can be aerobicjust do them at a faster pace. Vacuuming, raking or mowing the lawn, sweeping the garage, and washing and waxing the car all can help get your heart rate up. Strengthen your muscles during the week. You don't have to lift heavy weights or grow big, bulky muscles to get stronger. Doing a few simple activities that make your muscles work against, or \"resist,\" something can help you get stronger. For example, you can: · Do push-ups or sit-ups, which use your own body weight as resistance. · Lift weights or dumbbells or use stretch bands at home or in a gym or community center. Stretch your muscles often. Stretching will help you as you become more active. It can help you stay flexible, loosen tight muscles, and avoid injury. It can also help improve your balance and posture and can be a great way to relax. Be sure to stretch the muscles you'll be using when you work out.  It's best to warm your muscles slightly before you stretch them. Walk or do some other light aerobic activity for a few minutes, and then start stretching. When you stretch your muscles: · Do it slowly. Stretching is not about going fast or making sudden movements. · Don't push or bounce during a stretch. · Hold each stretch for at least 15 to 30 seconds, if you can. You should feel a stretch in the muscle, but not pain. · Breathe out as you do the stretch. Then breathe in as you hold the stretch. Don't hold your breath. If you're worried about how more activity might affect your health, have a checkup before you start. Follow any special advice your doctor gives you for getting a smart start. Where can you learn more? Go to http://www.gray.com/ Enter Q734 in the search box to learn more about \"Learning About Physical Activity. \" Current as of: January 16, 2020               Content Version: 12.6 © 2006-2020 [employer]. Care instructions adapted under license by [employer] (which disclaims liability or warranty for this information). If you have questions about a medical condition or this instruction, always ask your healthcare professional. [address] any warranty or liability for your use of this information.

## 2020-09-17 NOTE — PROGRESS NOTES
Subjective:     Maria Del Rosario Ferrara  is a 64 y.o. female who presents for follow-up about 9 months following laparoscopic gastric bypass surgery. She has lost a total of 96 pounds since surgery. Body mass index is 22.77 kg/m². . EBWL is (95%). The patient presents today to assess their progress toward their goal of weight loss and to address any issues that may be present. Today the patient and I have reviewed their diet and how appropriate their food choices are. The following issues have been identified - none from a surgical standpoint. She has a weight goal now of 135 lbs, which will put between BMI of 21 and 22. Surgery related complication: NA       She reports rare regurgitation of food if she eats too fast or too much and denies abdominal pain, diarrhea, nausea and reflux. The patients diet choices have been reviewed today and counseling was given. Fluid intake:      Protein intake:      Meals/day:    Taking vitamins as recommended. Patients pain score:0/10      The patient's exercise level: very active. Bike 8-15 miles a day    Changes in her medical history and medications have been reviewed. Hair loss has decreased. Hx of liver steatosis, LFTs normal at 6 month labs. No steatohepatitis, fibrosis or cirrhosis on pathology.     Comorbidities:    Hypertension: not applicable  Diabetes: not applicable  Obstructive Sleep Apnea: resolved  Hyperlipidemia: not applicable  Stress Urinary Incontinence: not applicable  Gastroesophageal Reflux: not applicable  Weight related arthropathy:improved      Patient Active Problem List   Diagnosis Code    Edema R60.9    Elevated sedimentation rate R70.0    Elevated liver enzymes R74.8    Diverticulitis K57.92    Intestinal malabsorption K90.9    S/P gastric bypass Z98.84    Steatosis of liver K76.0    Hair loss L65.9     Past Medical History:   Diagnosis Date    Arthritic-like pain     Diverticulitis     Hospitalized April 2019 [facility]  Edema     Elevated liver enzymes     Elevated sedimentation rate     Intestinal malabsorption 12/12/2019    Obstructive sleep apnea     Uses CPAP, Instructed to bring DOS    S/P gastric bypass 12/12/2019 12/06/19 by Dr. Kyler Jeffrey Severe obesity (BMI 35.0-39. 9) with comorbidity ([address]     Steatosis of liver 12/12/2019     Past Surgical History:   Procedure Laterality Date    HX ORTHOPAEDIC Right 2005    skin graft right first digit after trauma     Current Outpatient Medications   Medication Sig Dispense Refill    Biotin 2,500 mcg cap Take  by mouth.  cyanocobalamin 1,000 mcg tablet Take 1,000 mcg by mouth daily.  calcium citrate-vitamin d3 (CITRACAL+D) 315-200 mg-unit tab Take 1 Tab by mouth daily (with breakfast).  B.infantis-B.ani-B.long-B.bifi (PROBIOTIC 4X) 10-15 mg TbEC Take  by mouth.  multivitamin with iron (FLINTSTONES) chewable tablet Take 1 Tab by mouth daily.  ursodiol (ACTIGALL) 300 mg capsule Take 300 mg by mouth two (2) times a day.           Review of Symptoms:       General - No history or complaints of unexpected fever or chills  Head/Neck - No history or complaints of headache or dizziness  Cardiac - No history or complaints of chest pain, palpitations, or shortness of breath  Pulmonary - No history or complaints of shortness of breath or productive cough  Gastrointestinal - as noted above  Genitourinary - No history or complaints of hematuria/dysuria or renal lithiasis  Musculoskeletal - No history or complaints of joint  muscular weakness  Hematologic - No history of any bleeding episodes  Neurologic - No history or complaints of  migraine headaches or neurologic symptoms                     Objective:     Visit Vitals  Ht 5' 7\" (1.702 m)   Wt 66 kg (145 lb 6.4 oz)   BMI 22.77 kg/m²        Physical Exam:    General appearance - well appearing and in no distress  Mental status - alert, oriented to person, place, and time  Pulmonary - normal respiratory effort  Abdomen - no obvious distention  Neurological - normal speech, no focal findings or movement disorder noted  Extremities - normal movement  Musculoskeletal - moving extremities without difficulty  Skin - no rashes, no suspicious skin lesions noted    Assessment:     1. History of Morbid obesity, status post  laparoscopic gastric bypass surgery. The patient is doing extremely well from a weight loss and surgical standpoint. We will discuss meeting with the dietitian to address maintenance planning and the need to increase exercise to minimum of 300 minutes weekly. We will repeat labs near the end of November. Plan:     1. Remember to measure portions, continue low carbohydrate diet  2. Continue to concentrate on protein intake meeting daily requirements  3. Remember vitamin supplements. The importance of such was discussed regarding the malabsorptive issues that the surgery creates. 4. Exercise regimen appears to be: good  5. Try and attend support group if feasible. 6. Follow-up in 3 month(s). 7. Lab reviewed and appropriate changes made. This visit with Ms Toño Rai was performed under virtual telemedicine guidelines during the coronavirus (JOFVD-51) public health emergency on 9/17/20 in an interactive fashion using [employer]. They understand that this telemedicine encounter is a billable service, with coverage determined by their insurance carrier. They are aware that they may receive a bill and have provided verbal consent for this virtual visit. This visit was performed with the patient in their home environment and provider was   present at [facility]. I have spent over 30 minutes on this visit  both prior to the visit reviewing the patients chart and with the patient face to face. I have reviewed their medical history, performed a telemedicine physical examination, and discussed the plan of action to date.   They understand that they will be asked to come to the office when our office is allowed normal patient interaction, as dictated by public health officials, for a face-to-face visit to rediscuss all of the things we  have talked about today.

## 2020-09-17 NOTE — PROGRESS NOTES
Pt has a virtual appt with [facility] this morning. Pt stated that she is doing well.

## 2020-10-22 ENCOUNTER — VIRTUAL VISIT (OUTPATIENT)
Dept: SURGERY | Age: 60
End: 2020-10-22
Payer: COMMERCIAL

## 2020-10-22 VITALS — WEIGHT: 140 LBS | HEIGHT: 67 IN | BODY MASS INDEX: 21.97 KG/M2

## 2020-10-22 DIAGNOSIS — Z98.84 S/P GASTRIC BYPASS: ICD-10-CM

## 2020-10-22 DIAGNOSIS — K90.9 INTESTINAL MALABSORPTION, UNSPECIFIED TYPE: Primary | ICD-10-CM

## 2020-10-22 PROCEDURE — 99214 OFFICE O/P EST MOD 30 MIN: CPT | Performed by: NURSE PRACTITIONER

## 2020-10-22 NOTE — PATIENT INSTRUCTIONS
Patient Instructions 1. Remember hydration goals - minimum of 64 ounces of liquids per day (dehydration is the number one reason for hospital readmission). 2. Continue to monitor carbohydrate and protein intake you need a minimum of  Grams of protein daily- remember to keep your total carbohydrates to 50 grams or less per day for best results. 3. Continue to work towards exercise goals - 60-90 minutes, 5 times a week minimum of deliberate, aerobic exercise is the ultimate goal with strength training 2 times each week. Refer to [employer] for  information. 4. Remember to take vitamins as directed. 5. Attend support group the 2nd Thursday of each month. 6.  Constipation: Milk of Magnesia is for immediate relief only. Miralax is to be used every day if constipation is a chronic problem. 7.  Diarrhea: patients will occasionally develop lactose intolerance after surgery. Check to see if your protein shake has whey in it. If it does try a protein powder or drink that does not have whey and stop all yogurts, cheeses and milks to see if the diarrhea goes away. 8.  If you have had labs drawn. We will only call you if you have abnormal results. Otherwise you can access the lab results in \"mychart\". You will only need the access code the first time you sign on. 9.  Call us at [phone] or email us through [email or web address] with questions,     concerns or worsening of condition, we have someone on call 24 hours a day. If you are unable to reach our office, you are to go to your Primary Care Physician or the Emergency Department. NOTE TO GASTRIC BYPASS PATIENTS:  (SAME APPLIES TO GASTRIC SLEEVE PATIENTS FOR FIRST TWO MONTHS) Remember that for the rest of your life, you are not able to take the following: 
- NSAIDs (ibuprofen, goody powder, BC powder, Motrin, Advil, Mobic, Voltaren, Excedrin, etc.) - Steroid pills or injections - Smoke (cigarettes or recreational drugs) - Alcohol Use of any of the above may cause ulcers in your stomach which may perforate causing a medical emergency and surgery. Speak to our medical staff if another medical provider requires you to take steroids or NSAIDs. Supplement Resource Guide Importance of Protein:  
Maintains lean body mass, produces antibodies to fight off infections, heals wounds, minimizes hair loss, helps to give you energy, helps with satiety, and keeping you full between meals. Importance of Calcium: 
Needed for healthy bones and teeth, normal blood clotting, and nervous system functioning, higher risk of osteoporosis and bone disease with non-compliance. Importance of Multivitamins: Many functions. Supply you with extra nutrients that you may be missing from food. May lead to iron deficiency anemia, weakness, fatigue, and many other symptoms with non-compliance. Importance of B Vitamins: 
Important for red blood cell formation, metabolism, energy, and helps to maintain a healthy nervous system. Protein Supplement Find one you like now. Use immediately after surgery. Look for: 
35-50g protein each day from your protein supplement once you reach the progression diet. 0-3 g fat per serving 0-3 g sugar per serving Protein drinks should be split in separate dosages. Recommend: Lifelong 1 year + Calcium Supplement:  
 
Start taking within a month after surgery. Look for: Calcium Citrate Plus D (1500 mg per day) Recommend: Citracal 
 
 . Avoid chocolate chewable calcium. Can use chewable bariatric or [employer] brand or similar chewable. The body cannot absorb more than 500-600 mg of calcium at a time. Take for Life Multi-vitamin Supplement:   
 
Start immediately after surgery: any complete chewable, such as: [place]s Complete chewables. Avoid [place] sours or gummies. They lack iron and other important nutrients and also have added sugar. Continue with chewable vitamin or change to adult complete multivitamin one month after surgery. Menstruating women can take a prenatal vitamin. Make sure has at least 18 mg iron and 098-127 mcg folic acid Vitamin B12, B Complex Vitamin, and Biotin Start taking within a month after surgery. Vitamin B12:  1000 mcg of Vitamin B12 three times weekly Must take sublingually (meaning you take it under your tongue) or in a liquid drop form for easy absorption. B Complex Vitamin: Take a pill or liquid drop form once daily. Biotin: This vitamin can help prevent hair loss. Recommend 5mg  
(5000 mcg) a day Biotin is Optional  
 
 
 
 
  
Learning About Physical Activity What is physical activity? Physical activity is any kind of activity that gets your body moving. The types of physical activity that can help you get fit and stay healthy include: · Aerobic or \"cardio\" activities that make your heart beat faster and make you breathe harder, such as brisk walking, riding a bike, or running. Aerobic activities strengthen your heart and lungs and build up your endurance. · Strength training activities that make your muscles work against, or \"resist,\" something, such as lifting weights or doing push-ups. These activities help tone and strengthen your muscles. · Stretches that allow you to move your joints and muscles through their full range of motion. Stretching helps you be more flexible and avoid injury. What are the benefits of physical activity? Being active is one of the best things you can do for your health. It helps you to: · Feel stronger and have more energy to do all the things you like to do. · Focus better at school or work. · Feel, think, and sleep better. · Reach and stay at a healthy weight. · Lose fat and build lean muscle. · Lower your risk for serious health problems. · Keep your bones, muscles, and joints strong. How can you make physical activity part of your life? Get at least 30 minutes of exercise on most days of the week. Walking is a good choice. You also may want to do other activities, such as running, swimming, cycling, or playing tennis or team sports. Pick activities that you likeones that make your heart beat faster, your muscles stronger, and your muscles and joints more flexible. If you find more than one thing you like doing, do them all. You don't have to do the same thing every day. Get your heart pumping every day. Any activity that makes your heart beat faster and keeps it at that rate for a while counts. Here are some great ways to get your heart beating faster: · Go for a brisk walk, run, or bike ride. · Go for a hike or swim. · Go in-line skating. · Play a game of touch football, basketball, or soccer. · Ride a bike. · Play tennis or racquetball. · Climb stairs. Even some household chores can be aerobicjust do them at a faster pace. Vacuuming, raking or mowing the lawn, sweeping the garage, and washing and waxing the car all can help get your heart rate up. Strengthen your muscles during the week. You don't have to lift heavy weights or grow big, bulky muscles to get stronger. Doing a few simple activities that make your muscles work against, or \"resist,\" something can help you get stronger. For example, you can: · Do push-ups or sit-ups, which use your own body weight as resistance. · Lift weights or dumbbells or use stretch bands at home or in a gym or community center. Stretch your muscles often. Stretching will help you as you become more active. It can help you stay flexible, loosen tight muscles, and avoid injury. It can also help improve your balance and posture and can be a great way to relax. Be sure to stretch the muscles you'll be using when you work out.  It's best to warm your muscles slightly before you stretch them. Walk or do some other light aerobic activity for a few minutes, and then start stretching. When you stretch your muscles: · Do it slowly. Stretching is not about going fast or making sudden movements. · Don't push or bounce during a stretch. · Hold each stretch for at least 15 to 30 seconds, if you can. You should feel a stretch in the muscle, but not pain. · Breathe out as you do the stretch. Then breathe in as you hold the stretch. Don't hold your breath. If you're worried about how more activity might affect your health, have a checkup before you start. Follow any special advice your doctor gives you for getting a smart start. Where can you learn more? Go to http://www.gray.com/ Enter T133 in the search box to learn more about \"Learning About Physical Activity. \" Current as of: January 16, 2020               Content Version: 12.6 © 2006-2020 [employer]. Care instructions adapted under license by [employer] (which disclaims liability or warranty for this information). If you have questions about a medical condition or this instruction, always ask your healthcare professional. [address] any warranty or liability for your use of this information.

## 2020-12-15 ENCOUNTER — HOSPITAL ENCOUNTER (OUTPATIENT)
Dept: PREADMISSION TESTING | Age: 60
Discharge: HOME | End: 2020-12-15
Payer: COMMERCIAL

## 2020-12-15 DIAGNOSIS — L65.9 HAIR LOSS: ICD-10-CM

## 2020-12-15 DIAGNOSIS — K76.0 STEATOSIS OF LIVER: ICD-10-CM

## 2020-12-15 DIAGNOSIS — Z98.84 S/P GASTRIC BYPASS: ICD-10-CM

## 2020-12-15 DIAGNOSIS — K90.9 INTESTINAL MALABSORPTION, UNSPECIFIED TYPE: ICD-10-CM

## 2020-12-15 LAB
25(OH)D3 SERPL-MCNC: 39.7 NG/ML (ref 30–100)
ALBUMIN SERPL-MCNC: 4 G/DL (ref 3.4–5)
ALBUMIN/GLOB SERPL: 1.3 {RATIO} (ref 0.8–1.7)
ALP SERPL-CCNC: 112 U/L (ref 45–117)
ALT SERPL-CCNC: 18 U/L (ref 13–56)
ANION GAP SERPL CALC-SCNC: 4 MMOL/L (ref 3–18)
AST SERPL-CCNC: 20 U/L (ref 10–38)
BASOPHILS # BLD: 0 K/UL (ref 0–0.1)
BASOPHILS NFR BLD: 0 % (ref 0–2)
BILIRUB SERPL-MCNC: 0.8 MG/DL (ref 0.2–1)
BUN SERPL-MCNC: 15 MG/DL (ref 7–18)
BUN/CREAT SERPL: 19 (ref 12–20)
CALCIUM SERPL-MCNC: 9.5 MG/DL (ref 8.5–10.1)
CHLORIDE SERPL-SCNC: 105 MMOL/L (ref 100–111)
CO2 SERPL-SCNC: 30 MMOL/L (ref 21–32)
CREAT SERPL-MCNC: 0.8 MG/DL (ref 0.6–1.3)
DIFFERENTIAL METHOD BLD: ABNORMAL
EOSINOPHIL # BLD: 0.2 K/UL (ref 0–0.4)
EOSINOPHIL NFR BLD: 2 % (ref 0–5)
ERYTHROCYTE [DISTWIDTH] IN BLOOD BY AUTOMATED COUNT: 13.3 % (ref 11.6–14.5)
FERRITIN SERPL-MCNC: 227 NG/ML (ref 8–388)
FOLATE SERPL-MCNC: 18 NG/ML (ref 3.1–17.5)
GLOBULIN SER CALC-MCNC: 3.1 G/DL (ref 2–4)
GLUCOSE SERPL-MCNC: 82 MG/DL (ref 74–99)
HCT VFR BLD AUTO: 38.5 % (ref 35–45)
HGB BLD-MCNC: 13 G/DL (ref 12–16)
IRON SERPL-MCNC: 118 UG/DL (ref 50–175)
LYMPHOCYTES # BLD: 1.9 K/UL (ref 0.9–3.6)
LYMPHOCYTES NFR BLD: 28 % (ref 21–52)
MCH RBC QN AUTO: 32.3 PG (ref 24–34)
MCHC RBC AUTO-ENTMCNC: 33.8 G/DL (ref 31–37)
MCV RBC AUTO: 95.5 FL (ref 74–97)
MONOCYTES # BLD: 0.5 K/UL (ref 0.05–1.2)
MONOCYTES NFR BLD: 8 % (ref 3–10)
NEUTS SEG # BLD: 4.2 K/UL (ref 1.8–8)
NEUTS SEG NFR BLD: 62 % (ref 40–73)
PLATELET # BLD AUTO: 244 K/UL (ref 135–420)
PMV BLD AUTO: 11 FL (ref 9.2–11.8)
POTASSIUM SERPL-SCNC: 4.4 MMOL/L (ref 3.5–5.5)
PROT SERPL-MCNC: 7.1 G/DL (ref 6.4–8.2)
RBC # BLD AUTO: 4.03 M/UL (ref 4.2–5.3)
SODIUM SERPL-SCNC: 139 MMOL/L (ref 136–145)
VIT B12 SERPL-MCNC: 528 PG/ML (ref 211–911)
WBC # BLD AUTO: 6.8 K/UL (ref 4.6–13.2)

## 2020-12-15 PROCEDURE — 82306 VITAMIN D 25 HYDROXY: CPT

## 2020-12-15 PROCEDURE — 82728 ASSAY OF FERRITIN: CPT

## 2020-12-15 PROCEDURE — 84425 ASSAY OF VITAMIN B-1: CPT

## 2020-12-15 PROCEDURE — 82607 VITAMIN B-12: CPT

## 2020-12-15 PROCEDURE — 83540 ASSAY OF IRON: CPT

## 2020-12-15 PROCEDURE — 80053 COMPREHEN METABOLIC PANEL: CPT

## 2020-12-15 PROCEDURE — 36415 COLL VENOUS BLD VENIPUNCTURE: CPT

## 2020-12-15 PROCEDURE — 85025 COMPLETE CBC W/AUTO DIFF WBC: CPT

## 2020-12-17 NOTE — PROGRESS NOTES
Message sent through [address],    I just wanted to let you know that your labs look good overall. You vitamin D level is starting to trend downwards and I would recommend starting a separate vitamin D supplement of at least 2000 units a day. Everything else looks good. Be sure to get your B12 sublingual everyday. Feel free to call the office with any questions at [phone].     Take care,  DEBORAH Lee-BC

## 2020-12-22 LAB — VIT B1 BLD-SCNC: 119.3 NMOL/L (ref 66.5–200)

## 2021-01-27 ENCOUNTER — OFFICE VISIT (OUTPATIENT)
Dept: SURGERY | Age: 61
End: 2021-01-27
Payer: COMMERCIAL

## 2021-01-27 DIAGNOSIS — K90.9 INTESTINAL MALABSORPTION, UNSPECIFIED TYPE: Primary | ICD-10-CM

## 2021-01-27 DIAGNOSIS — Z98.84 S/P GASTRIC BYPASS: ICD-10-CM

## 2021-01-27 PROCEDURE — 99214 OFFICE O/P EST MOD 30 MIN: CPT | Performed by: SPECIALIST

## 2021-01-27 NOTE — PATIENT INSTRUCTIONS
Patient Instructions 1. Remember hydration goals - minimum of 64 ounces of liquids per day (dehydration is the number one reason for hospital readmission). 2. Sleep 7-9 hours each night to keep your metabolism up. 3. Continue to monitor carbohydrate and protein intake you need a minimum of  Grams of protein daily- remember to keep your total carbohydrates to 50 grams or less per day for best results. 4. To maximize weight loss keep your caloric intake between 800-1,200 calories daily. If you are exercising excessively, such as training for a marathon, you need to keep a food log and meet with the dietician so they can advise you on your diet choices, carbohydrate intake and caloric intake. 5. Continue to work towards exercise goals - 60-90 minutes, 5 times a week minimum of deliberate, aerobic exercise is the ultimate goal with strength training 2 times each week. Refer to [employer] for  information. 6. Remember to take vitamins as directed in your handbook. 7. Attend support group the 2nd Thursday of each month. 8. Constipation: Milk of Magnesia is for immediate relief only. Miralax is to be used every day if constipation is a chronic problem. 9. Diarrhea: patients will occasionally develop lactose intolerance after surgery. Check to see if your protein shake has whey in it. If it does try a protein powder or drink that does not have whey and stop all yogurts, cheeses and milks to see if the diarrhea goes away. 10. If you have had labs drawn. We will only call you if you have abnormal results. Otherwise you can access the lab results in \"mychart\". You will only need the access code the first time you sign on. 11. Call us at [phone] or email us through [email or web address]" with questions,     concerns or worsening of condition, we have someone on call 24 hours a day. If you are unable to reach our office, you are to go to your Primary Care Physician or the Emergency Department. NOTE TO GASTRIC BYPASS PATIENTS:  (SAME APPLIES TO GASTRIC SLEEVE PATIENTS FOR FIRST TWO MONTHS) Remember that for the rest of your life, you are not able to take the following: 
- NSAIDs (ibuprofen, goody powder, BC powder, Motrin, Advil, Mobic, Voltaren, Excedrin, etc.) - Steroid pills or injections - Smoke (cigarettes or recreational drugs) - Alcohol Use of any of the above may cause ulcers in your stomach which may perforate causing a medical emergency and surgery. Speak to our medical staff if another medical provider requires you to take steroids or NSAIDs. Supplement Resource Guide Importance of Protein:  
Maintains lean body mass, produces antibodies to fight off infections, heals wounds, minimizes hair loss, helps to give you energy, helps with satiety, and keeping you full between meals. Importance of Calcium: 
Needed for healthy bones and teeth, normal blood clotting, and nervous system functioning, higher risk of osteoporosis and bone disease with non-compliance. Importance of Multivitamins: Many functions. Supply you with extra nutrients that you may be missing from food. May lead to iron deficiency anemia, weakness, fatigue, and many other symptoms with non-compliance. Importance of B Vitamins: 
Important for red blood cell formation, metabolism, energy, and helps to maintain a healthy nervous system. Protein Supplement Liquid diet phase: consume 90-100g protein daily. Once you are eating consume 35-50g protein each day from your protein supplement. 0-3 g fat per serving 0-3 g sugar per serving The body can only absorb 30g of protein at one time, so do not consume more than that at one time. Multi-vitamin Supplement:   
Start immediately after surgery: any complete chewable, such as: [employer]s Complete chewables. Avoid [employer] sours or gummies. They lack iron and other important nutrients and also have added sugar. Continue with a chewable vitamin or change to an adult complete multivitamin one month after surgery. Menstruating women can take a prenatal vitamin. Make sure it has at least 18 mg iron and 394-737 mcg folic acid Calcium Supplement:  
 
Start taking within one month after surgery. Look for:  
Calcium Citrate Plus D (1500 mg per day) Recommend: Citracal 
 
Avoid chocolate chewable calcium. Can use chewable bariatric or [employer] brand or similar chewable. The body cannot absorb more than 500-600 mg of calcium at one time. Take for Life Vitamin D Take 3,000 international units daily Vitamin B12 B Complex Vitamin Start taking both within one month after surgery. Vitamin B12 (sublingual): Take 1000 mcg of Vitamin B12 three times weekly Must take sublingually (meaning you put it under your tongue) or in a liquid drop form for easy absorption. B Complex Vitamin:  
Take one pill daily or liquid drop form daily; as directed on bottle. Take for Life

## 2021-01-27 NOTE — PROGRESS NOTES
Subjective:      Michael Woods is a 64 y.o. female is now 1 years status post laparoscopic gastric bypass surgery. Doing well overall. She has lost a total of 106 pounds since surgery. Body mass index is 21.07 kg/m². Has lost 105% of EBW. Currently on a solid food diet without difficulty, reports no issues and denies vomiting and abdominal pain. Taking in >64oz water daily. Sources of protein include crap, shrimp, chicken, beef. 25-45 min of activity 4 days a week, including piliates, yoga, etc.  Patient is sleeping 8-9 hours a night on average. She has a burning sensation on her skin between her scapula. She has seen her PCP and has completed a CXR and has an MRI scheduled. She states the skin is of normal color and free of any type of rash. Bowel movements are regular. The patient is not having any pain. . The patient is compliant with multivitamins, calcium, Vit D and B12 supplements.      Weight Loss Metrics 1/27/2021 10/22/2020 9/17/2020 6/17/2020 4/7/2020 2/6/2020 1/2/2020   Today's Wt 134 lb 8 oz 140 lb 145 lb 6.4 oz 166 lb 182 lb 205 lb 219 lb   BMI 21.07 kg/m2 21.93 kg/m2 22.77 kg/m2 26 kg/m2 28.51 kg/m2 32.11 kg/m2 34.3 kg/m2          Comorbidities:    Hypertension: not applicable  Diabetes: not applicable  Obstructive Sleep Apnea: resolved  Hyperlipidemia: not applicable  Stress Urinary Incontinence: not applicable  Gastroesophageal Reflux: not applicable  Weight related arthropathy: resolved     Patient Active Problem List   Diagnosis Code    Edema R60.9    Elevated sedimentation rate R70.0    Elevated liver enzymes R74.8    Diverticulitis K57.92    Intestinal malabsorption K90.9    S/P gastric bypass Z98.84    Steatosis of liver K76.0    Hair loss L65.9        Past Medical History:   Diagnosis Date    Arthritic-like pain     Diverticulitis     Hospitalized April 2019 [address]    Edema     Elevated liver enzymes     Elevated sedimentation rate     Intestinal malabsorption 12/12/2019    Obstructive sleep apnea     Uses CPAP, Instructed to bring DOS    S/P gastric bypass 12/12/2019 12/06/19 by Dr. David Broderick Severe obesity (BMI 35.0-39. 9) with comorbidity ([address]     Steatosis of liver 12/12/2019       Past Surgical History:   Procedure Laterality Date    HX LAP GASTRIC BYPASS  12/2019    [place] with hiatal hernia repair    HX ORTHOPAEDIC Right 2005    skin graft right first digit after trauma       Current Outpatient Medications   Medication Sig Dispense Refill    cyanocobalamin 1,000 mcg tablet Take 1,000 mcg by mouth daily.  multivitamin with iron (FLINTSTONES) chewable tablet Take 1 Tab by mouth daily.          No Known Allergies    Review of Systems:  General - No history or complaints of unexpected fever or chills  Head/Neck - No history or complaints of headache or dizziness  Cardiac - No history or complaints of chest pain, palpitations, or shortness of breath  Pulmonary - No history or complaints of shortness of breath or productive cough  Gastrointestinal - as noted above  Genitourinary - No history or complaints of hematuria/dysuria or renal lithiasis  Musculoskeletal - No history or complaints of joint  muscular weakness  Hematologic - No history of any bleeding episodes  Neurologic - No history or complaints of  migraine headaches or neurologic symptoms    Objective:     Visit Vitals  BP (!) 98/54 (BP Patient Position: Sitting)   Pulse 60   Temp 98.4 °F (36.9 °C)   Resp 16   Ht 5' 7\" (1.702 m)   Wt 61 kg (134 lb 8 oz)   SpO2 99%   BMI 21.07 kg/m²     General:  alert, cooperative, no distress, appears stated age   Chest: lungs clear to auscultation, breath sounds equal and symmetric, no rhonchi, rales or wheezes, no accessory muscle use   Cor:   Regular rate and rhythm or without murmur or extra heart sounds   Abdomen: soft, bowel sounds active, non-tender, no masses or organomegaly   Incisions:   healing well, no drainage, no erythema, no hernia, no seroma, no swelling, no dehiscence, incision well approximated           Assessment and Plan   1. Intestinal malabsorption  - Continue required Vitamins: B12, B complex, D, iron, calcium, multivitamin  2. S/p laparoscopic bariatric surgery, GASTRIC BYPASS, history of morbid obesity  - Sleep goal is 7-9 hours each night. Patient education given on the effects of sleep deprivation on weight control.  - Discussed patients weight loss goals and dietary choices in relation to goals. - Reminded to measure portions, continue high protein, low carbohydrate diet. Reminded to eat regularly, to eat slowly & not to drink with meals.    - Continue cardio exercise and add resistance exercises. 60-90 minutes of aerobic activity 5 days a week and strength training 2 days each week. - Encouraged to attend support group   - Required fluid intake is >64oz daily of sugar free beverages. Follow up in 6 months or sooner if patient has questions, concerns or worsening of condition. If unable to reach our office and receive immediate care, patient should go to the Emergency Department immediately. Ms. Rodger Guevara has a reminder for a \"due or due soon\" health maintenance. I have asked that she contact her primary care provider for a follow-up on this health maintenance.

## 2021-01-28 VITALS
DIASTOLIC BLOOD PRESSURE: 54 MMHG | RESPIRATION RATE: 16 BRPM | HEIGHT: 67 IN | TEMPERATURE: 98.4 F | WEIGHT: 134.5 LBS | SYSTOLIC BLOOD PRESSURE: 98 MMHG | OXYGEN SATURATION: 99 % | BODY MASS INDEX: 21.11 KG/M2 | HEART RATE: 60 BPM

## 2021-07-28 ENCOUNTER — OFFICE VISIT (OUTPATIENT)
Dept: SURGERY | Age: 61
End: 2021-07-28
Payer: COMMERCIAL

## 2021-07-28 VITALS
WEIGHT: 132.3 LBS | OXYGEN SATURATION: 97 % | HEART RATE: 70 BPM | SYSTOLIC BLOOD PRESSURE: 109 MMHG | HEIGHT: 67 IN | BODY MASS INDEX: 20.76 KG/M2 | DIASTOLIC BLOOD PRESSURE: 59 MMHG | RESPIRATION RATE: 16 BRPM

## 2021-07-28 DIAGNOSIS — K90.9 INTESTINAL MALABSORPTION, UNSPECIFIED TYPE: Primary | ICD-10-CM

## 2021-07-28 PROCEDURE — 99214 OFFICE O/P EST MOD 30 MIN: CPT | Performed by: SPECIALIST

## 2021-07-28 NOTE — PATIENT INSTRUCTIONS

## 2021-07-28 NOTE — PROGRESS NOTES
1.5 years follow-up    Subjective:     Onel Kaba  is a 64 y.o. female who presents for follow-up about 18 months following laparoscopic gastric bypass surgery. She has lost a total of 109 pounds since surgery. Body mass index is 20.72 kg/m². . EBWL is (108%). The patient presents today to assess their progress toward their goal of weight loss and to address any issues that may be present. Today the patient and I have reviewed their diet and how appropriate their food choices are. The following issues have been identified - none from a surgical standpoint. Pain assessment - 0/10  . Surgery related complication: NA       She reports no real issues and denies vomiting, abdominal pain, diarrhea and difficulty breathing. The patient's exercise level: moderately active. Changes in her medical history and medications have been reviewed. Patient Active Problem List   Diagnosis Code    Edema R60.9    Elevated sedimentation rate R70.0    Elevated liver enzymes R74.8    Diverticulitis K57.92    Intestinal malabsorption K90.9    S/P gastric bypass Z98.84    Steatosis of liver K76.0    Hair loss L65.9     Past Medical History:   Diagnosis Date    Arthritic-like pain     Diverticulitis     Hospitalized April 2019 [address]    Edema     Elevated liver enzymes     Elevated sedimentation rate     Intestinal malabsorption 12/12/2019    Obstructive sleep apnea     Uses CPAP, Instructed to bring DOS    S/P gastric bypass 12/12/2019 12/06/19 by Dr. Mirna Oquendo Severe obesity (BMI 35.0-39. 9) with comorbidity ([address]     Steatosis of liver 12/12/2019     Past Surgical History:   Procedure Laterality Date    HX LAP GASTRIC BYPASS  12/2019    [place] with hiatal hernia repair    HX ORTHOPAEDIC Right 2005    skin graft right first digit after trauma     Current Outpatient Medications   Medication Sig Dispense Refill    multivitamin with iron (FLINTSTONES) chewable tablet Take 1 Tab by mouth daily.  cyanocobalamin 1,000 mcg tablet Take 1,000 mcg by mouth daily. (Patient not taking: Reported on 7/28/2021)         Review of Symptoms:     General - No history or complaints of unexpected fever or chills  Head/Neck - No history or complaints of headache or dizziness  Cardiac - No history or complaints of chest pain, palpitations, or shortness of breath  Pulmonary - No history or complaints of shortness of breath or productive cough  Gastrointestinal - as noted above  Genitourinary - No history or complaints of hematuria/dysuria or renal lithiasis  Musculoskeletal - No history or complaints of joint  muscular weakness  Hematologic - No history of any bleeding episodes  Neurologic - No history or complaints of  migraine headaches or neurologic symptoms    Objective:     Visit Vitals  BP (!) 109/59 (BP 1 Location: Left upper arm, BP Patient Position: Sitting, BP Cuff Size: Large adult)   Pulse 70   Resp 16   Ht 5' 7\" (1.702 m)   Wt 60 kg (132 lb 4.8 oz)   SpO2 97%   BMI 20.72 kg/m²        Physical Exam:    General:  alert, cooperative, no distress, appears stated age   Lungs:   clear to auscultation bilaterally   Heart:  Regular rate and rhythm   Abdomen:   abdomen is soft without significant tenderness, masses, organomegaly or guarding; Incisions: healing well, no significant drainage         Labs:     No results found for this or any previous visit (from the past 2016 hour(s)). Assessment:     1. History of Morbid obesity, status post  laparoscopic gastric bypass surgery. Doing well, no concerns. Discussed the fact that she does not need to lose any further weight. A recent PCP visit was unremarkable. She is taking no prescription medications. Plan:     1. Remember to measure portions, continue low carbohydrate diet  2. Reviewed labs and appropriate changes made to vitamin regimen  3. Remember vitamin supplements. 4. Exercise regimen appears adequate.   5. Attend support group  6. Follow-up in 6 month(s). 7. The patient understands the plan of action  8. Total time spent with the patient 30 minutes.

## 2021-09-15 ENCOUNTER — TELEPHONE (OUTPATIENT)
Dept: SURGERY | Age: 61
End: 2021-09-15

## 2021-09-15 DIAGNOSIS — R10.13 EPIGASTRIC PAIN: Primary | ICD-10-CM

## 2021-09-15 NOTE — TELEPHONE ENCOUNTER
Pt called and stated that she is having terrible abd pain after she eats. Pt denies any other symptoms. Pt went to [facility] last night and had a CT scan and lab done. Pt stated that the doctor told her everything came back okay. Per Dr Krista Box to order STAT U/S. I spoke with scheduling and pt has an appt tomorrow at [facility] at 3:00 pt needs to arrive at 2:30 and NPO 8 hrs prior. Pt is aware.

## 2021-09-16 ENCOUNTER — HOSPITAL ENCOUNTER (OUTPATIENT)
Dept: ULTRASOUND IMAGING | Age: 61
Discharge: HOME | End: 2021-09-16
Attending: SPECIALIST
Payer: COMMERCIAL

## 2021-09-16 DIAGNOSIS — R10.13 EPIGASTRIC PAIN: ICD-10-CM

## 2021-09-16 PROCEDURE — 76705 ECHO EXAM OF ABDOMEN: CPT

## 2022-01-19 ENCOUNTER — OFFICE VISIT (OUTPATIENT)
Dept: SURGERY | Age: 62
End: 2022-01-19

## 2022-01-19 LAB
25(OH)D3+25(OH)D2 SERPL-MCNC: 35.7 NG/ML (ref 30–100)
ALBUMIN SERPL-MCNC: 4.5 G/DL (ref 3.8–4.9)
ALBUMIN/GLOB SERPL: 1.8 {RATIO} (ref 1.2–2.2)
ALP SERPL-CCNC: 112 IU/L (ref 44–121)
ALT SERPL-CCNC: 12 IU/L (ref 0–32)
AST SERPL-CCNC: 20 IU/L (ref 0–40)
BASOPHILS # BLD AUTO: 0 X10E3/UL (ref 0–0.2)
BASOPHILS NFR BLD AUTO: 1 %
BILIRUB SERPL-MCNC: 0.8 MG/DL (ref 0–1.2)
BUN SERPL-MCNC: 20 MG/DL (ref 6–24)
BUN/CREAT SERPL: 22 (ref 9–23)
CALCIUM SERPL-MCNC: 9.4 MG/DL (ref 8.7–10.2)
CHLORIDE SERPL-SCNC: 103 MMOL/L (ref 96–106)
CO2 SERPL-SCNC: 25 MMOL/L (ref 20–29)
CREAT SERPL-MCNC: 0.89 MG/DL (ref 0.57–1)
EOSINOPHIL # BLD AUTO: 0.1 X10E3/UL (ref 0–0.4)
EOSINOPHIL NFR BLD AUTO: 2 %
ERYTHROCYTE [DISTWIDTH] IN BLOOD BY AUTOMATED COUNT: 12.1 % (ref 11.7–15.4)
FERRITIN SERPL-MCNC: 169 NG/ML (ref 15–150)
FOLATE SERPL-MCNC: 9 NG/ML
GLOBULIN SER CALC-MCNC: 2.5 G/DL (ref 1.5–4.5)
GLUCOSE SERPL-MCNC: 98 MG/DL (ref 65–99)
HCT VFR BLD AUTO: 34.7 % (ref 34–46.6)
HGB BLD-MCNC: 11.6 G/DL (ref 11.1–15.9)
IMM GRANULOCYTES # BLD AUTO: 0 X10E3/UL (ref 0–0.1)
IMM GRANULOCYTES NFR BLD AUTO: 0 %
IRON SERPL-MCNC: 118 UG/DL (ref 27–159)
LYMPHOCYTES # BLD AUTO: 1.9 X10E3/UL (ref 0.7–3.1)
LYMPHOCYTES NFR BLD AUTO: 28 %
MCH RBC QN AUTO: 31 PG (ref 26.6–33)
MCHC RBC AUTO-ENTMCNC: 33.4 G/DL (ref 31.5–35.7)
MCV RBC AUTO: 93 FL (ref 79–97)
MONOCYTES # BLD AUTO: 0.7 X10E3/UL (ref 0.1–0.9)
MONOCYTES NFR BLD AUTO: 11 %
NEUTROPHILS # BLD AUTO: 3.9 X10E3/UL (ref 1.4–7)
NEUTROPHILS NFR BLD AUTO: 58 %
PLATELET # BLD AUTO: 279 X10E3/UL (ref 150–450)
POTASSIUM SERPL-SCNC: 4.5 MMOL/L (ref 3.5–5.2)
PROT SERPL-MCNC: 7 G/DL (ref 6–8.5)
RBC # BLD AUTO: 3.74 X10E6/UL (ref 3.77–5.28)
SODIUM SERPL-SCNC: 142 MMOL/L (ref 134–144)
SPECIMEN STATUS REPORT, ROLRST: NORMAL
VIT B1 BLD-SCNC: 102.7 NMOL/L (ref 66.5–200)
VIT B12 SERPL-MCNC: 320 PG/ML (ref 232–1245)
WBC # BLD AUTO: 6.7 X10E3/UL (ref 3.4–10.8)

## 2022-02-21 ENCOUNTER — OFFICE VISIT (OUTPATIENT)
Dept: SURGERY | Age: 62
End: 2022-02-21
Payer: COMMERCIAL

## 2022-02-21 VITALS
DIASTOLIC BLOOD PRESSURE: 60 MMHG | TEMPERATURE: 96.8 F | BODY MASS INDEX: 20.99 KG/M2 | HEIGHT: 67 IN | SYSTOLIC BLOOD PRESSURE: 122 MMHG | WEIGHT: 133.7 LBS | HEART RATE: 56 BPM | OXYGEN SATURATION: 100 %

## 2022-02-21 DIAGNOSIS — K90.9 INTESTINAL MALABSORPTION, UNSPECIFIED TYPE: Primary | ICD-10-CM

## 2022-02-21 DIAGNOSIS — Z98.84 S/P GASTRIC BYPASS: ICD-10-CM

## 2022-02-21 PROCEDURE — 99214 OFFICE O/P EST MOD 30 MIN: CPT | Performed by: SPECIALIST

## 2022-02-21 NOTE — PROGRESS NOTES
Subjective:   Hector Cantu  is a 63 y.o. female who presents for follow-up about 2.17 years following laparoscopic gastric bypass surgery. Surgery related complication: NA. She has lost a total of 133 pounds since surgery. Body mass index is 20.94 kg/m². Yamini Bloodgood Loss of EBW is 107%. The patient presents today to assess their progress toward their weight loss goal & to address any issues that may be present:   She is tolerating solid foods without difficulty, reports lower crampy abdominal pain following UTI and seeing GYN soon. She denies vomiting, upper abdominal pain, nausea and reflux. Fluid intake:  good                              Protein intake:  fair, no supplements; yogurt, peanuts, tolerating all proteins except hot dogs  Eating regularly. The patient is taking recommended vitamins. The patient's exercise level: not active. Just lost both parents in [facility] 3 months    Changes in her medical history and medications have been reviewed. Comorbidities:    Hypertension: not applicable  Diabetes: not applicable  Obstructive Sleep Apnea: resolved  Hyperlipidemia: not applicable  Stress Urinary Incontinence: not applicable  Gastroesophageal Reflux: not applicable  Weight related arthropathy:resolved    Patient Active Problem List   Diagnosis Code    Edema R60.9    Elevated sedimentation rate R70.0    Elevated liver enzymes R74.8    Diverticulitis K57.92    Intestinal malabsorption K90.9    S/P gastric bypass Z98.84    Steatosis of liver K76.0    Hair loss L65.9     Past Medical History:   Diagnosis Date    Arthritic-like pain     Diverticulitis     Hospitalized April 2019 [address]    Edema     Elevated liver enzymes     Elevated sedimentation rate     Intestinal malabsorption 12/12/2019    Obstructive sleep apnea     Uses CPAP, Instructed to bring DOS    S/P gastric bypass 12/12/2019 12/06/19 by Dr. Donnie Bryson Severe obesity (BMI 35.0-39. 9) with comorbidity ([address]  Steatosis of liver 12/12/2019     Past Surgical History:   Procedure Laterality Date    HX LAP GASTRIC BYPASS  12/2019    [place] with hiatal hernia repair    HX ORTHOPAEDIC Right 2005    skin graft right first digit after trauma     Current Outpatient Medications   Medication Sig Dispense Refill    NITROFURANTOIN PO Take  by mouth.  multivitamin with iron (FLINTSTONES) chewable tablet Take 1 Tab by mouth daily.  cyanocobalamin 1,000 mcg tablet Take 1,000 mcg by mouth daily.  (Patient not taking: Reported on 7/28/2021)         Review of Systems:  General - Denies fatigue, fever, chills  Cardiac - Denies chest pain, palpitations, shortness of breath  Pulmonary - Denies shortness of breath, productive cough  GI - as noted above  Musculoskeletal - Denies joint or muscular weakness, pain, stiffness  Hematologic - Denies abnormal bleeding, bruising  Neurologic -  Denies weakness, paralysis, numbness, tingling    Objective:     Visit Vitals  /60 (BP 1 Location: Left upper arm, BP Patient Position: Sitting, BP Cuff Size: Large adult)   Pulse (!) 56   Temp 96.8 °F (36 °C)   Ht 5' 7\" (1.702 m)   Wt 60.6 kg (133 lb 11.2 oz)   SpO2 100%   BMI 20.94 kg/m²        Physical Exam:      General appearance:  alert, cooperative, no distress, appears stated age   Mental status   alert, oriented to person, place, and time   Neck  supple, no significant adenopathy     Lymphatics  no palpable lymphadenopathy, no hepatosplenomegaly   Chest  clear to auscultation, no wheezes, rales or rhonchi, symmetric air entry   Heart  normal rate, regular rhythm, normal S1, S2, no murmurs, rubs, clicks or gallops    Abdomen: soft, nontender, nondistended, no masses or organomegaly   Incision:  Well healed, no hernias      Neurological  alert, oriented, normal speech, no focal findings or movement disorder noted   Musculoskeletal no joint tenderness, deformity or swelling   Extremities peripheral pulses normal, no pedal edema, no clubbing or cyanosis   Skin normal coloration and turgor, no rashes, no suspicious skin lesions noted          Labs:     Recent Results (from the past 2016 hour(s))   CBC/DIFF AMBIGUOUS DEFAULT    Collection Time: 01/11/22  1:10 PM   Result Value Ref Range    WBC 6.7 3.4 - 10.8 x10E3/uL    RBC 3.74 (L) 3.77 - 5.28 x10E6/uL    HGB 11.6 11.1 - 15.9 g/dL    HCT 34.7 34.0 - 46.6 %    MCV 93 79 - 97 fL    MCH 31.0 26.6 - 33.0 pg    MCHC 33.4 31.5 - 35.7 g/dL    RDW 12.1 11.7 - 15.4 %    PLATELET 906 474 - 862 x10E3/uL    NEUTROPHILS 58 Not Estab. %    Lymphocytes 28 Not Estab. %    MONOCYTES 11 Not Estab. %    EOSINOPHILS 2 Not Estab. %    BASOPHILS 1 Not Estab. %    ABS. NEUTROPHILS 3.9 1.4 - 7.0 x10E3/uL    Abs Lymphocytes 1.9 0.7 - 3.1 x10E3/uL    ABS. MONOCYTES 0.7 0.1 - 0.9 x10E3/uL    ABS. EOSINOPHILS 0.1 0.0 - 0.4 x10E3/uL    ABS. BASOPHILS 0.0 0.0 - 0.2 x10E3/uL    IMMATURE GRANULOCYTES 0 Not Estab. %    ABS. IMM. GRANS. 0.0 0.0 - 0.1 R98I9/DV   METABOLIC PANEL, COMPREHENSIVE    Collection Time: 01/11/22  1:10 PM   Result Value Ref Range    Glucose 98 65 - 99 mg/dL    BUN 20 6 - 24 mg/dL    Creatinine 0.89 0.57 - 1.00 mg/dL    GFR est non-AA 71 >59 mL/min/1.73    GFR est AA 82 >59 mL/min/1.73    BUN/Creatinine ratio 22 9 - 23    Sodium 142 134 - 144 mmol/L    Potassium 4.5 3.5 - 5.2 mmol/L    Chloride 103 96 - 106 mmol/L    CO2 25 20 - 29 mmol/L    Calcium 9.4 8.7 - 10.2 mg/dL    Protein, total 7.0 6.0 - 8.5 g/dL    Albumin 4.5 3.8 - 4.9 g/dL    GLOBULIN, TOTAL 2.5 1.5 - 4.5 g/dL    A-G Ratio 1.8 1.2 - 2.2    Bilirubin, total 0.8 0.0 - 1.2 mg/dL    Alk.  phosphatase 112 44 - 121 IU/L    AST (SGOT) 20 0 - 40 IU/L    ALT (SGPT) 12 0 - 32 IU/L   VITAMIN B12 & FOLATE    Collection Time: 01/11/22  1:10 PM   Result Value Ref Range    Vitamin B12 320 232 - 1,245 pg/mL    Folate 9.0 >3.0 ng/mL   VITAMIN D, 25 HYDROXY    Collection Time: 01/11/22  1:10 PM   Result Value Ref Range    VITAMIN D, 25-HYDROXY 35.7 30.0 - 100.0 ng/mL   VITAMIN B1, WHOLE BLOOD    Collection Time: 01/11/22  1:10 PM   Result Value Ref Range    Vitamin B1 102.7 66.5 - 200.0 nmol/L   IRON    Collection Time: 01/11/22  1:10 PM   Result Value Ref Range    Iron 118 27 - 159 ug/dL   FERRITIN    Collection Time: 01/11/22  1:10 PM   Result Value Ref Range    Ferritin 169 (H) 15 - 150 ng/mL   SPECIMEN STATUS REPORT    Collection Time: 01/11/22  1:10 PM   Result Value Ref Range    SPECIMEN STATUS REPORT COMMENT        Recent Labs     01/11/22  1310 12/15/20  1401 06/16/20  1445   VITD3 35.7 39.7 54.5       Assessment and Plan:   1. Intestinal malabsorption  a. continue required Vitamins: B12, B complex, D, iron, calcium, multivitamin  2. S/p laparoscopic bariatric surgery, GASTRIC BYPASS, history of morbid obesity. Has done very well wither her weight loss and feeling great. Does need to resume using her B12 again as lower than we like. She needs to resume exercise and add strength training. Is very pleased with results of her surgery and transitioned to maintenance phase. I gave her a handout to reinforce dietary guidelines we reviewed today. Would recommend at least 1 protein supplement a day. a. Sleep goal is 7-9 hours each night. Patient education given on the effects of sleep deprivation on weight control. b. Discussed patients weight loss goals and dietary choices in relation to goals. c. Reminded to measure portions, continue high protein, low carbohydrate diet. Reminded to eat regularly, to eat slowly & not to drink with meals. d. Continue cardio exercise and add resistance exercises. 60-90 minutes of aerobic activity 5 days a week and strength training 2 days each week. e. Encouraged to attend support group   f. Required fluid intake is >64oz daily of decaffeinated sugar free beverages.      Labs ordered today  Follow up in 10 months or sooner if patient has questions, concerns or worsening of condition, if unable to reach our office, patient should report to the ED. Ms. Quiana Mosher has a reminder for a \"due or due soon\" health maintenance. I have asked that she contact her primary care provider for a follow-up on this health maintenance.      Total time spent with patient was 30 minutes

## 2022-02-21 NOTE — PATIENT INSTRUCTIONS
Patient Instructions      1. Remember hydration goals - minimum of 64 ounces of liquids per day (dehydration is the number one reason for hospital readmission). 2. Continue to monitor carbohydrate and protein intake you need a minimum of  Grams of protein daily- remember to keep your total carbohydrates to 50 grams or less per day for best results. 3. Continue to work towards exercise goals - 60-90 minutes, 5 times a week minimum of deliberate, aerobic exercise is the ultimate goal with strength training 2 times each week. Refer to [employer] for  information. 4. Remember to take vitamins as directed. 5. Attend support group the 2nd Thursday of each month. 6.  Constipation: Milk of Magnesia is for immediate relief only. Miralax is to be used every day if constipation is a chronic problem. 7.  Diarrhea: patients will occasionally develop lactose intolerance after surgery. Check to see if your protein shake has whey in it. If it does try a protein powder or drink that does not have whey and stop all yogurts, cheeses and milks to see if the diarrhea goes away. 8.  If you have had labs drawn. We will only call you if you have abnormal results. Otherwise you can access the lab results in \"mychart\". You will only need the access code the first time you sign on. 9.  Call us at [phone] or email us through [email or web address]" with questions,     concerns or worsening of condition, we have someone on call 24 hours a day. If you are unable to reach our office, you are to go to your Primary Care Physician or the Emergency Department.      NOTE TO GASTRIC BYPASS PATIENTS:  (SAME APPLIES TO GASTRIC SLEEVE PATIENTS FOR FIRST TWO MONTHS)  Remember that for the rest of your life, you are not able to take the following:  - NSAIDs (ibuprofen, goody powder, BC powder, Motrin, Advil, Mobic, Voltaren, Excedrin, etc.)  - Steroid pills or injections  - Smoke (cigarettes or recreational drugs)  - Alcohol  Use of any of the above may cause ulcers in your stomach which may perforate causing a medical emergency and surgery. Speak to our medical staff if another medical provider requires you to take steroids or NSAIDs. Supplement Resource Guide    Importance of Protein:   Maintains lean body mass, produces antibodies to fight off infections, heals wounds, minimizes hair loss, helps to give you energy, helps with satiety, and keeping you full between meals. Importance of Calcium:  Needed for healthy bones and teeth, normal blood clotting, and nervous system functioning, higher risk of osteoporosis and bone disease with non-compliance. Importance of Multivitamins: Many functions. Supply you with extra nutrients that you may be missing from food. May lead to iron deficiency anemia, weakness, fatigue, and many other symptoms with non-compliance. Importance of B Vitamins:  Important for red blood cell formation, metabolism, energy, and helps to maintain a healthy nervous system. Protein Supplement  Find one you like now. Use immediately after surgery. Look for:  35-50g protein each day from your protein supplement once you reach the progression diet. 0-3 g fat per serving  0-3 g sugar per serving    Protein drinks should be split in separate dosages. Recommend: Lifelong  1 year + Calcium Supplement:     Start taking within a month after surgery. Look for: Calcium Citrate Plus D (1500 mg per day)  Recommend: Citracal     .            Avoid chocolate chewable calcium. Can use chewable bariatric or [employer] brand or similar chewable. The body cannot absorb more than 500-600 mg of calcium at a time. Take for Life Multi-vitamin Supplement:      Start immediately after surgery: any complete chewable, such as: Hargills Complete chewables. Avoid Hargill sours or gummies.   They lack iron and other important nutrients and also have added sugar. Continue with chewable vitamin or change to adult complete multivitamin one month after surgery. Menstruating women can take a prenatal vitamin. Make sure has at least 18 mg iron and 971-647 mcg folic acid   Vitamin W97, B Complex Vitamin, and Biotin  Start taking within a month after surgery. Vitamin B12:  1000 mcg of Vitamin B12 three times weekly    Must take sublingually (meaning you take it under your tongue) or in a liquid drop form for easy absorption. B Complex Vitamin: Take a pill or liquid drop form once daily. Biotin: This vitamin can help prevent hair loss. Recommend 5mg   (5000 mcg) a day  Biotin is Optional              Learning About Being Physically Active  What is physical activity? Being physically active means doing any kind of activity that gets your body moving. The types of physical activity that can help you get fit and stay healthy include:  · Aerobic or \"cardio\" activities. These make your heart beat faster and make you breathe harder, such as brisk walking, riding a bike, or running. They strengthen your heart and lungs and build up your endurance. · Strength training activities. These make your muscles work against, or \"resist,\" something. Examples include lifting weights or doing push-ups. These activities help tone and strengthen your muscles and bones. · Stretches. These let you move your joints and muscles through their full range of motion. Stretching helps you be more flexible. What are the benefits of being active? Being active is one of the best things you can do for your health. It helps you to:  · Feel stronger and have more energy to do all the things you like to do. · Focus better at school or work. · Feel, think, and sleep better. · Reach and stay at a healthy weight. · Lose fat and build lean muscle.   · Lower your risk for serious health problems, including diabetes, heart attack, high blood pressure, and some cancers. · Keep your heart, lungs, bones, muscles, and joints strong and healthy. How can you make being active part of your life? Start slowly. Make it your long-term goal to get at least 30 minutes of exercise on most days of the week. Walking is a good choice. You also may want to do other activities, such as running, swimming, cycling, or playing tennis or team sports. Pick activities that you like--ones that make your heart beat faster, your muscles stronger, and your muscles and joints more flexible. If you find more than one thing you like doing, do them all. You don't have to do the same thing every day. Get your heart pumping every day. Any activity that makes your heart beat faster and keeps it at that rate for a while counts. Here are some great ways to get your heart beating faster:  · Go for a brisk walk, run, or bike ride. · Go for a hike or swim. · Go in-line skating. · Play a game of touch football, basketball, or soccer. · Ride a bike. · Play tennis or racquetball. · Climb stairs. Even some household chores can be aerobic--just do them at a faster pace. Vacuuming, raking or mowing the lawn, sweeping the garage, and washing and waxing the car all can help get your heart rate up. Strengthen your muscles during the week. You don't have to lift heavy weights or grow big, bulky muscles to get stronger. Doing a few simple activities that make your muscles work against, or \"resist,\" something can help you get stronger. For example, you can:  · Do push-ups or sit-ups, which use your own body weight as resistance. · Lift weights or dumbbells or use stretch bands at home or in a gym or community center. Stretch your muscles often. Stretching will help you as you become more active. It can help you stay flexible, loosen tight muscles, and avoid injury. It can also help improve your balance and posture and can be a great way to relax.   Be sure to stretch the muscles you'll be using when you work out. It's best to warm your muscles slightly before you stretch them. Walk or do some other light aerobic activity for a few minutes, and then start stretching. When you stretch your muscles:  · Do it slowly. Stretching is not about going fast or making sudden movements. · Don't push or bounce during a stretch. · Hold each stretch for at least 15 to 30 seconds, if you can. You should feel a stretch in the muscle, but not pain. · Breathe out as you do the stretch. Then breathe in as you hold the stretch. Don't hold your breath. If you're worried about how more activity might affect your health, have a checkup before you start. Follow any special advice your doctor gives you for getting a smart start. Where can you learn more? Go to http://www.gray.com/  Enter [id number] in the search box to learn more about \"Learning About Being Physically Active. \"  Current as of: May 12, 2021               Content Version: 13.0  © 2006-2021 [employer]. Care instructions adapted under license by [employer] (which disclaims liability or warranty for this information). If you have questions about a medical condition or this instruction, always ask your healthcare professional. [address] any warranty or liability for your use of this information.

## 2022-03-18 PROBLEM — K57.92 DIVERTICULITIS: Status: ACTIVE | Noted: 2019-04-18

## 2022-03-18 PROBLEM — L65.9 HAIR LOSS: Status: ACTIVE | Noted: 2020-06-17

## 2022-03-19 PROBLEM — K76.0 STEATOSIS OF LIVER: Status: ACTIVE | Noted: 2019-12-12

## 2022-03-19 PROBLEM — Z98.84 S/P GASTRIC BYPASS: Status: ACTIVE | Noted: 2019-12-12

## 2022-03-19 PROBLEM — K90.9 INTESTINAL MALABSORPTION: Status: ACTIVE | Noted: 2019-12-12

## 2022-11-04 DIAGNOSIS — E53.9 VITAMIN B DEFICIENCY: ICD-10-CM

## 2022-11-04 DIAGNOSIS — K90.9 INTESTINAL MALABSORPTION, UNSPECIFIED TYPE: Primary | ICD-10-CM

## 2022-11-04 DIAGNOSIS — E55.9 HYPOVITAMINOSIS D: ICD-10-CM

## 2022-11-04 DIAGNOSIS — Z98.84 S/P GASTRIC BYPASS: ICD-10-CM

## 2022-12-09 LAB
25(OH)D3+25(OH)D2 SERPL-MCNC: 28.1 NG/ML (ref 30–100)
ALBUMIN SERPL-MCNC: 4.6 G/DL (ref 3.8–4.9)
ALBUMIN/GLOB SERPL: 1.9 {RATIO} (ref 1.2–2.2)
ALP SERPL-CCNC: 125 IU/L (ref 44–121)
ALT SERPL-CCNC: 15 IU/L (ref 0–32)
AST SERPL-CCNC: 23 IU/L (ref 0–40)
BASOPHILS # BLD AUTO: 0 X10E3/UL (ref 0–0.2)
BASOPHILS NFR BLD AUTO: 1 %
BILIRUB SERPL-MCNC: 0.7 MG/DL (ref 0–1.2)
BUN SERPL-MCNC: 17 MG/DL (ref 8–27)
BUN/CREAT SERPL: 24 (ref 12–28)
CALCIUM SERPL-MCNC: 9.6 MG/DL (ref 8.7–10.3)
CHLORIDE SERPL-SCNC: 101 MMOL/L (ref 96–106)
CO2 SERPL-SCNC: 25 MMOL/L (ref 20–29)
CREAT SERPL-MCNC: 0.72 MG/DL (ref 0.57–1)
EGFR: 96 ML/MIN/1.73
EOSINOPHIL # BLD AUTO: 0.1 X10E3/UL (ref 0–0.4)
EOSINOPHIL NFR BLD AUTO: 2 %
ERYTHROCYTE [DISTWIDTH] IN BLOOD BY AUTOMATED COUNT: 12.3 % (ref 11.7–15.4)
FERRITIN SERPL-MCNC: 116 NG/ML (ref 15–150)
FOLATE SERPL-MCNC: 12 NG/ML
GLOBULIN SER CALC-MCNC: 2.4 G/DL (ref 1.5–4.5)
GLUCOSE SERPL-MCNC: 90 MG/DL (ref 70–99)
HCT VFR BLD AUTO: 35.6 % (ref 34–46.6)
HGB BLD-MCNC: 11.7 G/DL (ref 11.1–15.9)
IMM GRANULOCYTES # BLD AUTO: 0 X10E3/UL (ref 0–0.1)
IMM GRANULOCYTES NFR BLD AUTO: 0 %
IRON SERPL-MCNC: 104 UG/DL (ref 27–159)
LYMPHOCYTES # BLD AUTO: 1.9 X10E3/UL (ref 0.7–3.1)
LYMPHOCYTES NFR BLD AUTO: 34 %
MCH RBC QN AUTO: 31.5 PG (ref 26.6–33)
MCHC RBC AUTO-ENTMCNC: 32.9 G/DL (ref 31.5–35.7)
MCV RBC AUTO: 96 FL (ref 79–97)
MONOCYTES # BLD AUTO: 0.4 X10E3/UL (ref 0.1–0.9)
MONOCYTES NFR BLD AUTO: 8 %
NEUTROPHILS # BLD AUTO: 3.1 X10E3/UL (ref 1.4–7)
NEUTROPHILS NFR BLD AUTO: 55 %
PLATELET # BLD AUTO: 264 X10E3/UL (ref 150–450)
POTASSIUM SERPL-SCNC: 4.8 MMOL/L (ref 3.5–5.2)
PROT SERPL-MCNC: 7 G/DL (ref 6–8.5)
RBC # BLD AUTO: 3.71 X10E6/UL (ref 3.77–5.28)
SODIUM SERPL-SCNC: 140 MMOL/L (ref 134–144)
VIT B1 BLD-SCNC: 107.3 NMOL/L (ref 66.5–200)
VIT B12 SERPL-MCNC: 325 PG/ML (ref 232–1245)
WBC # BLD AUTO: 5.6 X10E3/UL (ref 3.4–10.8)

## 2023-01-18 ENCOUNTER — OFFICE VISIT (OUTPATIENT)
Dept: SURGERY | Age: 63
End: 2023-01-18
Payer: COMMERCIAL

## 2023-01-18 VITALS
SYSTOLIC BLOOD PRESSURE: 95 MMHG | BODY MASS INDEX: 23.35 KG/M2 | HEART RATE: 73 BPM | DIASTOLIC BLOOD PRESSURE: 62 MMHG | HEIGHT: 67 IN | WEIGHT: 148.8 LBS | TEMPERATURE: 97.3 F | OXYGEN SATURATION: 100 %

## 2023-01-18 DIAGNOSIS — K90.9 INTESTINAL MALABSORPTION, UNSPECIFIED TYPE: Primary | ICD-10-CM

## 2023-01-18 DIAGNOSIS — Z98.84 S/P GASTRIC BYPASS: ICD-10-CM

## 2023-01-18 DIAGNOSIS — R74.8 ELEVATED LIVER ENZYMES: ICD-10-CM

## 2023-01-18 PROBLEM — K57.92 DIVERTICULITIS: Status: RESOLVED | Noted: 2019-04-18 | Resolved: 2023-01-18

## 2023-01-18 PROBLEM — L65.9 HAIR LOSS: Status: RESOLVED | Noted: 2020-06-17 | Resolved: 2023-01-18

## 2023-01-18 PROBLEM — K76.0 STEATOSIS OF LIVER: Status: RESOLVED | Noted: 2019-12-12 | Resolved: 2023-01-18

## 2023-01-18 RX ORDER — ERGOCALCIFEROL 1.25 MG/1
50000 CAPSULE ORAL 2 TIMES WEEKLY
Qty: 52 CAPSULE | Refills: 0 | Status: SHIPPED | OUTPATIENT
Start: 2023-01-20 | End: 2023-07-18

## 2023-01-18 NOTE — PROGRESS NOTES
Subjective:     Rose Mcdaniel  is a 63 y.o. female who presents for follow-up about 3 years following laparoscopic gastric bypass surgery. She has lost a total of 94 pounds since surgery. Body mass index is 23.31 kg/m². . EBWL is (100%). The patient presents today to assess their progress toward their goal of weight loss and to address any issues that may be present. Today the patient and I have reviewed their diet and how appropriate their food choices are. The following issues have been identified : The patient has felt well since our last visit and has had no major health issues. She is very happy with her weight loss. She has gained about 10 pounds since last year but states she still feels great and is exercising 5 times a week. .  Surgery related complication: none       She reports no issues and denies vomiting and abdominal pain. The patients diet choices have been reviewed today and counseling was given. Fluid intake:good      Protein intake:good      Meals/day:3    Patients pain score:0    The patient's exercise level: very active or exercises 5 times a week. Changes in her medical history and medications have been reviewed.     Comorbidities:    Hypertension: improved  Diabetes: not applicable  Obstructive Sleep Apnea: not applicable  Hyperlipidemia: improved  Stress Urinary Incontinence: not applicable  Gastroesophageal Reflux: not applicable  Weight related arthropathy:improved    Patient Active Problem List   Diagnosis Code    Edema R60.9    Elevated sedimentation rate R70.0    Elevated liver enzymes R74.8    Diverticulitis K57.92    Intestinal malabsorption K90.9    S/P gastric bypass Z98.84    Steatosis of liver K76.0    Hair loss L65.9     Past Medical History:   Diagnosis Date    Arthritic-like pain     Diverticulitis     Hospitalized April 2019 [facility] 24    Edema     Elevated liver enzymes     Elevated sedimentation rate     Intestinal malabsorption 12/12/2019 Obstructive sleep apnea     Uses CPAP, Instructed to bring DOS    S/P gastric bypass 12/12/2019 12/06/19 by Dr. Burgos Means    Severe obesity (BMI 35.0-39. 9) with comorbidity ([address]     Steatosis of liver 12/12/2019     Past Surgical History:   Procedure Laterality Date    HX LAP GASTRIC BYPASS  12/2019    Swetha with hiatal hernia repair    HX ORTHOPAEDIC Right 2005    skin graft right first digit after trauma     Current Outpatient Medications   Medication Sig Dispense Refill    cyanocobalamin 1,000 mcg tablet Take 1,000 mcg by mouth daily. multivitamin with iron (FLINTSTONES) chewable tablet Take 1 Tab by mouth daily.           Review of Symptoms:       General - No history or complaints of unexpected fever or chills  Head/Neck - No history or complaints of headache or dizziness  Cardiac - No history or complaints of chest pain, palpitations, or shortness of breath  Pulmonary - No history or complaints of shortness of breath or productive cough  Gastrointestinal - as noted above  Genitourinary - No history or complaints of hematuria/dysuria or renal lithiasis  Musculoskeletal - No history or complaints of joint  muscular weakness  Hematologic - No history of any bleeding episodes  Neurologic - No history or complaints of  migraine headaches or neurologic symptoms                     Objective:     Visit Vitals  BP 95/62 (BP 1 Location: Left upper arm, BP Patient Position: Sitting, BP Cuff Size: Adult long)   Pulse 73   Temp 97.3 °F (36.3 °C)   Ht 5' 7\" (1.702 m)   Wt 67.5 kg (148 lb 12.8 oz)   SpO2 100%   BMI 23.31 kg/m²        Physical Exam:      General appearance:  alert, cooperative, no distress, appears stated age   Mental status   alert, oriented to person, place, and time   Neck  supple, no significant adenopathy     Lymphatics  no palpable lymphadenopathy, no hepatosplenomegaly   Chest  clear to auscultation, no wheezes, rales or rhonchi, symmetric air entry   Heart  normal rate, regular rhythm, normal S1, S2, no murmurs, rubs, clicks or gallops    Abdomen: soft, nontender, nondistended, no masses or organomegaly   Incision:  Well healed, no hernias      Neurological  alert, oriented, normal speech, no focal findings or movement disorder noted   Musculoskeletal no joint tenderness, deformity or swelling   Extremities peripheral pulses normal, no pedal edema, no clubbing or cyanosis   Skin normal coloration and turgor, no rashes, no suspicious skin lesions noted          Lab Results   Component Value Date/Time    WBC 5.6 12/06/2022 02:11 PM    HGB 11.7 12/06/2022 02:11 PM    HCT 35.6 12/06/2022 02:11 PM    PLATELET 047 49/72/2517 02:11 PM    MCV 96 12/06/2022 02:11 PM     Lab Results   Component Value Date/Time    Sodium 140 12/06/2022 02:11 PM    Potassium 4.8 12/06/2022 02:11 PM    Chloride 101 12/06/2022 02:11 PM    CO2 25 12/06/2022 02:11 PM    Anion gap 4 12/15/2020 02:00 PM    Glucose 90 12/06/2022 02:11 PM    BUN 17 12/06/2022 02:11 PM    Creatinine 0.72 12/06/2022 02:11 PM    BUN/Creatinine ratio 24 12/06/2022 02:11 PM    GFR est AA 82 01/11/2022 01:10 PM    GFR est non-AA 71 01/11/2022 01:10 PM    Calcium 9.6 12/06/2022 02:11 PM    Bilirubin, total 0.7 12/06/2022 02:11 PM    Alk.  phosphatase 125 (H) 12/06/2022 02:11 PM    Protein, total 7.0 12/06/2022 02:11 PM    Albumin 4.6 12/06/2022 02:11 PM    Globulin 3.1 12/15/2020 02:00 PM    A-G Ratio 1.9 12/06/2022 02:11 PM    ALT (SGPT) 15 12/06/2022 02:11 PM     Lab Results   Component Value Date/Time    Iron 104 12/06/2022 02:11 PM    Ferritin 116 12/06/2022 02:11 PM     Lab Results   Component Value Date/Time    Folate 12.0 12/06/2022 02:11 PM     Lab Results   Component Value Date/Time    Vitamin D 25-Hydroxy 39.7 12/15/2020 02:01 PM    VITAMIN D, 25-HYDROXY 28.1 (L) 12/06/2022 02:11 PM         Recent Labs     12/06/22  1411 01/11/22  1310   VITD3 28.1* 35.7         Assessment and Plan:   Intestinal malabsorption  continue required Vitamins: B12, B complex, D, iron, calcium, multivitamin  S/p laparoscopic bariatric surgery, gastric bypass, history of morbid obesity  Sleep goal is 7-9 hours each night. Patient education given on the effects of sleep deprivation on weight control. Discussed patients weight loss goals and dietary choices in relation to goals. Reminded to measure portions, continue high protein, low carbohydrate diet. Reminded to eat regularly, to eat slowly & not to drink with meals. Continue cardio exercise and add resistance exercises. 60-90 minutes of aerobic activity 5 days a week and strength training 2 days each week. Encouraged to attend support group   Required fluid intake is >64oz daily of decaffeinated sugar free beverages. Labs reviewed today. I have reviewed all of her labs with her today and we have chosen to go ahead and treat her vitamin D level with 50,000 unit capsule twice weekly for the next 6 months then we will revert back to a daily vitamin D tablet. Total time spent with the patient 30 minutes.

## 2023-01-18 NOTE — PATIENT INSTRUCTIONS

## 2023-02-28 ENCOUNTER — HOSPITAL ENCOUNTER (OUTPATIENT)
Facility: HOSPITAL | Age: 63
Setting detail: RECURRING SERIES
Discharge: HOME | End: 2023-03-03
Payer: COMMERCIAL

## 2023-02-28 PROCEDURE — 98960 EDU&TRN PT SELF-MGMT NQHP 1: CPT

## 2023-02-28 PROCEDURE — 97161 PT EVAL LOW COMPLEX 20 MIN: CPT

## 2023-02-28 NOTE — PROGRESS NOTES
[address] [place] [address] PHYSICAL THERAPY AT [facility]. [place], [address] - Phone: [phone]  Fax: [phone] / [address]  Patient Name: Brittany Shay : 1960   Treatment   Diagnosis: L shoulder pain Medical Diagnosis: No admission diagnoses are documented for this encounter. Onset Date: 2023     Referral Source: Savana Sorensen [facility]): 2023   Prior Hospitalization: See medical history Provider #: [id number]   Prior Level of Function: Pt was having L shoulder pain with ADLs   Comorbidities: See Medical History   Medications: Verified on Patient Summary List   The Plan of Care and following information is based on the information from the initial evaluation.   ===========================================================================================  Patient is a 63year old female that presents to PT s/p L rotator cuff repair and bicep tenodesis. Pt reports pain as 5-10/10, located anterior L shoulder. Pain today is rated as 9/10. Describes pain as sharp and constant in nature. Rest, ice, and pain meds makes the pain better. Movement makes the pain worse. Patient is employed as a dental , which requires sitting and driving.     ==========================================================================  Objective Measures:     Posture: [] Poor    [] Fair    [x] Good    Describe:    ROM:  [x] Unable to assess at this time                                           AROM                                                             Left   Flexion DNA   Extension DNA   Scaptin/ABD DNA   ER @ 0 Degrees DNA   ER @ 90 Degrees DNA   IR @ 90 Degrees DNA     Did not assess PROM due to significant pain when trying to perform. Pt able to do pendulum and showed the ability to get about 35deg of shoulder flexion.  Pt educated on taking prescribed pain meds prior to appointments to allow for PROM of the shoulder to improve mobility     Strength:   [x] Unable to assess at this time                                                                             FOTO Score: 12/100     Other tests/ comments: Pt was wearing sling correctly. Pt had changed dressing and states she has showered twice since surgery.  Pt educated on proper donning and doffing of sling    Pt will benefit from PT interventions to address the aforementioned deficits and allow pt to return to PLOF.  ===========================================================================================  Evaluation Complexity:  History:  LOW Complexity : Zero comorbidities / personal factors that will impact the outcome / POC; Examination:  LOW Complexity : 1-2 Standardized tests and measures addressing body structure, function, activity limitation and / or participation in recreation  ;Presentation:  LOW Complexity : Stable, uncomplicated  ;Clinical Decision Making:  HIGH Complexity : FOTO score of 1- 25  FOTO score = an established functional score where 100 = no disability  Overall Complexity Rating: LOW   Problem List: pain affecting function, decrease ROM, decrease strength, decrease ADL/functional abilities, decrease activity tolerance, and decrease flexibility/joint mobility   Treatment Plan may include any combination of the followin Therapeutic Exercise, 02939 Neuromuscular Re-Education, 20068 Manual Therapy, 89686 Therapeutic Activity, 24215 Self Care/Home Management, and 00851 Electrical Stim unattended  Patient / Family readiness to learn indicated by: asking questions, trying to perform skills, interest, and return verbalization   Persons(s) to be included in education: patient (P)  Barriers to Learning/Limitations: none  Measures taken if barriers to learning present: -  Patient Goal (s): full mobility  Patient Self Reported Health Status: excellent  Rehabilitation Potential: good    Short Term Goals: To be accomplished in  4  weeks:  1. Pt will be independent and compliant with HEP to decrease pain, increase ROM and return pt to PLOF. 2. Pt will demonstrate a GROC score of >/= +2 to show overall improvement in function     Long Term Goals: To be accomplished in  12  weeks:  1. Increase score on FOTO to > or = to 65 points to demo an increase in functional activity tolerance with the LE. 2. Pt will note < or = 2/10 pain with all mobility to improve comfort with ADLs. 3. Pt will demonstrate a GROC score of >/= +5 to show overall improvement in function  Frequency / Duration:   Patient to be seen  1-3  times per week for 12  weeks:  Patient / Caregiver education and instruction: self care, activity modification and exercises    Therapist Signature: Ceasar Swann PT Date: 9/40/7007   Certification Period: - Time: 3:13 PM   ===========================================================================================  I certify that the above Physical Therapy Services are being furnished while the patient is under my care. I agree with the treatment plan and certify that this therapy is necessary. Physician Signature:        Date:       Time:         Marie Ramos*    Please sign and return to [facility] or you may fax the signed copy to [phone]. Thank you.

## 2023-02-28 NOTE — PROGRESS NOTES
PHYSICAL / OCCUPATIONAL THERAPY - DAILY TREATMENT NOTE (updated )    Patient Name: Shane Landon    Date: 2023    : 1960  Insurance: Payor: Soila Hidalgo / Plan: Levine, Susan. \[facility] a [facility] and Outlook.\""luis / Product Type: *No Product type* /      Patient  verified Yes   Visit #   Current / Total 1 12   Time   In / Out 2:10 3:00   Pain   In / Out 9 9   Subjective Functional Status/Changes: See Eval   Changes to:  Meds, Allergies, Med Hx, Sx Hx? If yes, update Summary List No      TREATMENT AREA =  L shoulder    OBJECTIVE    Modalities Rationale:     decrease inflammation and decrease pain to improve patient's ability to progress to PLOF and address remaining functional goals. min [] Estim Unattended, type/location:                                      []  w/ice    []  w/heat    min [] Estim Attended, type/location:                                     []  w/US     []  w/ice    []  w/heat    []  TENS insruct      min []  Mechanical Traction: type/lbs                   []  pro   []  sup   []  int   []  cont    []  before manual    []  after manual    min []  Ultrasound, settings/location:      min []  Iontophoresis w/ dexamethasone, location:                                               []  take home patch       []  in clinic   10 min  unbill [x]  Ice     []  Heat    location/position: L shoulder    min []  Paraffin,  details:     min []  Vasopneumatic Device, press/temp:     min []  Charariella Garcia / Brooklyn Nicks: If using vaso (only need to measure limb vaso being performed on)      pre-treatment girth :       post-treatment girth :       measured at (landmark location) :      min []  Other:    Skin assessment post-treatment (if applicable):    []  intact    []  redness- no adverse reaction                 []redness - adverse reaction:         Therapeutic Procedures:   Tx Min Billable or 1:1 Min (if diff from Tx Min) Procedure, Rationale, Specifics   10  63520 Self Care/Home Management (timed):  improve patient knowledge and understanding of pain reducing techniques, positioning, posture/ergonomics, home safety, activity modification, and diagnosis/prognosis  to improve patient's ability to progress to PLOF and address remaining functional goals. (see flow sheet as applicable)     Details if applicable:              Details if applicable:            Details if applicable:            Details if applicable:            Details if applicable:     10  [facility] Totals Reminder: bill using total billable min of TIMED therapeutic procedures (example: do not include dry needle or estim unattended, both untimed codes, in totals to left)  8-22 min = 1 unit; 23-37 min = 2 units; 38-52 min = 3 units; 53-67 min = 4 units; 68-82 min = 5 units   Total Total     [x]  Patient Education billed concurrently with other procedures   [x] Review HEP    [] Progressed/Changed HEP, detail:    [] Other detail:       Objective Information/Functional Measures/Assessment        Patient will continue to benefit from skilled PT / OT services to modify and progress therapeutic interventions, analyze and address functional mobility deficits, analyze and address ROM deficits, analyze and address strength deficits, and analyze and address soft tissue restrictions to address functional deficits and attain remaining goals.     Progress toward goals / Updated goals:  [x]  See Eval      PLAN  Yes Continue plan of care  [x]  Upgrade activities as tolerated  []  Discharge due to :  []  Other:    Jalyn Ross PT    2/28/2023    3:13 PM    Future Appointments   Date Time Provider Mahesh Wiggins   3/3/2023  3:00 PM Jalyn Ross, PT [facility]

## 2023-03-03 ENCOUNTER — HOSPITAL ENCOUNTER (OUTPATIENT)
Facility: HOSPITAL | Age: 63
Setting detail: RECURRING SERIES
Discharge: HOME | End: 2023-03-06
Payer: COMMERCIAL

## 2023-03-03 PROCEDURE — 97140 MANUAL THERAPY 1/> REGIONS: CPT

## 2023-03-03 PROCEDURE — 97110 THERAPEUTIC EXERCISES: CPT

## 2023-03-03 NOTE — PROGRESS NOTES
PHYSICAL / OCCUPATIONAL THERAPY - DAILY TREATMENT NOTE (updated )    Patient Name: Letha Domingo    Date: 3/3/2023    : 1960  Insurance: Payor: Ranulfo Canchola / Plan: Rogelio / Product Type: *No Product type* /      Patient  verified Yes   Visit #   Current / Total 2 12   Time   In / Out 3:20 4:00   Pain   In / Out 7 7   Subjective Functional Status/Changes: Pt reports doing th exercises regularly. Changes to:  Meds, Allergies, Med Hx, Sx Hx? If yes, update Summary List No      TREATMENT AREA =  L shoulder pain    OBJECTIVE    Modalities Rationale:     decrease inflammation and decrease pain to improve patient's ability to progress to PLOF and address remaining functional goals. min [] Estim Unattended, type/location:                                      []  w/ice    []  w/heat    min [] Estim Attended, type/location:                                     []  w/US     []  w/ice    []  w/heat    []  TENS insruct      min []  Mechanical Traction: type/lbs                   []  pro   []  sup   []  int   []  cont    []  before manual    []  after manual    min []  Ultrasound, settings/location:      min []  Iontophoresis w/ dexamethasone, location:                                               []  take home patch       []  in clinic    min  unbill []  Ice     []  Heat    location/position:     min []  Paraffin,  details:     min []  Vasopneumatic Device, press/temp:     min []  Raciel Abhishek / Walda Dancer: If using vaso (only need to measure limb vaso being performed on)      pre-treatment girth :       post-treatment girth :       measured at (landmark location) :      min []  Other:    Skin assessment post-treatment (if applicable):    []  intact    []  redness- no adverse reaction                 []redness - adverse reaction:         Therapeutic Procedures:   Tx Min Billable or 1:1 Min (if diff from Tx Min) Procedure, Rationale, Specifics   15  25146 Therapeutic Exercise (timed):  increase ROM, strength, coordination, balance, and proprioception to improve patient's ability to progress to PLOF and address remaining functional goals. (see flow sheet as applicable)     Details if applicable:       15  24013 Manual Therapy (timed):  decrease pain, increase ROM, and increase tissue extensibility to improve patient's ability to progress to PLOF and address remaining functional goals. The manual therapy interventions were performed at a separate and distinct time from the therapeutic activities interventions . (see flow sheet as applicable)     Details if applicable:            Details if applicable:            Details if applicable:            Details if applicable:     27  [facility] Totals Reminder: bill using total billable min of TIMED therapeutic procedures (example: do not include dry needle or estim unattended, both untimed codes, in totals to left)  8-22 min = 1 unit; 23-37 min = 2 units; 38-52 min = 3 units; 53-67 min = 4 units; 68-82 min = 5 units   Total Total     [x]  Patient Education billed concurrently with other procedures   [x] Review HEP    [] Progressed/Changed HEP, detail:    [] Other detail:       Objective Information/Functional Measures/Assessment    PROM flexion about 40deg  Significant guarding with PROM    Patient will continue to benefit from skilled PT / OT services to modify and progress therapeutic interventions, analyze and address functional mobility deficits, analyze and address ROM deficits, analyze and address strength deficits, and analyze and address soft tissue restrictions to address functional deficits and attain remaining goals. Progress toward goals / Updated goals:  []  See Progress Note/Recertification    Fair Progress to    [] STG    [x] LTG  1 as shown by pain with any movement.      PLAN  Yes Continue plan of care  [x]  Upgrade activities as tolerated  []  Discharge due to :  []  Other:    Carmella Salmeron, PT    3/3/2023    4:39 PM    Future Appointments   Date Time Provider Mahesh Wiggins   3/7/2023  3:00 PM Calli Webber, PT [facility]   3/10/2023  2:30 PM Calli Webber, PT [facility]   3/14/2023  2:30 PM Sisi Luis, PTA [facility]   3/17/2023  2:30 PM Calli Webber, PT [facility]   3/21/2023  2:30 PM Sisi Luis, PTA [facility]   3/23/2023  1:30 PM Sisi Luis, PTA [facility]   3/28/2023  1:30 PM Calli Webber, PT [facility]   3/30/2023  1:30 PM Sisi Luis, PTA [facility]

## 2023-03-07 ENCOUNTER — HOSPITAL ENCOUNTER (OUTPATIENT)
Facility: HOSPITAL | Age: 63
Setting detail: RECURRING SERIES
Discharge: HOME | End: 2023-03-10
Payer: COMMERCIAL

## 2023-03-07 PROCEDURE — 97110 THERAPEUTIC EXERCISES: CPT

## 2023-03-07 PROCEDURE — 97140 MANUAL THERAPY 1/> REGIONS: CPT

## 2023-03-07 NOTE — PROGRESS NOTES
PHYSICAL / OCCUPATIONAL THERAPY - DAILY TREATMENT NOTE (updated )    Patient Name: Nomi Carbone    Date: 3/7/2023    : 1960  Insurance: Payor: Tarsha Stevens / Plan: [facility] / Product Type: *No Product type* /      Patient  verified Yes   Visit #   Current / Total 3 12   Time   In / Out 3:00 3:40   Pain   In / Out 2 2   Subjective Functional Status/Changes: Pt reports that she has been able to let the shoulder relax while sitting on the couch by taking the sling off. Pt states she is doing the exercises regularly and its starting to feel better   Changes to:  Meds, Allergies, Med Hx, Sx Hx? If yes, update Summary List No      TREATMENT AREA =  No admission diagnoses are documented for this encounter. OBJECTIVE    Modalities Rationale:     decrease inflammation and decrease pain to improve patient's ability to progress to PLOF and address remaining functional goals. min [] Estim Unattended, type/location:                                      []  w/ice    []  w/heat    min [] Estim Attended, type/location:                                     []  w/US     []  w/ice    []  w/heat    []  TENS insruct      min []  Mechanical Traction: type/lbs                   []  pro   []  sup   []  int   []  cont    []  before manual    []  after manual    min []  Ultrasound, settings/location:      min []  Iontophoresis w/ dexamethasone, location:                                               []  take home patch       []  in clinic   10 min  unbill [x]  Ice     []  Heat    location/position: L shoulder    min []  Paraffin,  details:     min []  Vasopneumatic Device, press/temp:     min []  Terrie Suarez / Yuliana Hernandez:     If using vaso (only need to measure limb vaso being performed on)      pre-treatment girth :       post-treatment girth :       measured at (landmark location) :      min []  Other:    Skin assessment post-treatment (if applicable):    []  intact    []  redness- no adverse reaction                 []redness - adverse reaction:         Therapeutic Procedures: Tx Min Billable or 1:1 Min (if diff from Tx Min) Procedure, Rationale, Specifics          Details if applicable:       15  16744 Therapeutic Exercise (timed):  increase ROM, strength, coordination, balance, and proprioception to improve patient's ability to progress to PLOF and address remaining functional goals. (see flow sheet as applicable)     Details if applicable:     15  94663 Manual Therapy (timed):  decrease pain, increase ROM, and increase tissue extensibility to improve patient's ability to progress to PLOF and address remaining functional goals. The manual therapy interventions were performed at a separate and distinct time from the therapeutic activities interventions . (see flow sheet as applicable)     Details if applicable:            Details if applicable:            Details if applicable:     27  [facility] Totals Reminder: bill using total billable min of TIMED therapeutic procedures (example: do not include dry needle or estim unattended, both untimed codes, in totals to left)  8-22 min = 1 unit; 23-37 min = 2 units; 38-52 min = 3 units; 53-67 min = 4 units; 68-82 min = 5 units   Total Total     [x]  Patient Education billed concurrently with other procedures   [x] Review HEP    [] Progressed/Changed HEP, detail:    [] Other detail:       Objective Information/Functional Measures/Assessment    Shoulder PROM flexion: 55deg    Patient will continue to benefit from skilled PT / OT services to modify and progress therapeutic interventions, analyze and address functional mobility deficits, analyze and address ROM deficits, analyze and address strength deficits, and analyze and address soft tissue restrictions to address functional deficits and attain remaining goals.     Progress toward goals / Updated goals:  []  See Progress Note/Recertification    Good Progress to    [] STG    [x] LTG  1 as shown by improved mobility needed for ADLs    PLAN  Yes Continue plan of care  [x]  Upgrade activities as tolerated  []  Discharge due to :  []  Other:    Charly Room, PT    3/7/2023    5:29 PM    Future Appointments   Date Time Provider Mahesh Wiggins   3/10/2023  2:30 PM Charly Room, PT [facility] [address]   3/14/2023  2:30 PM Chaneta Kaykay, PTA [facility] [address]   3/17/2023  2:30 PM Charly Room, PT [facility] [address]   3/21/2023  2:30 PM Chaneta Kaykay, PTA [facility] [address]   3/23/2023  1:30 PM Chaneta Kaykay, PTA [facility] [address]   3/28/2023  1:30 PM Charly Room, PT [facility] [address]   3/30/2023  1:30 PM Chaneta Kaykay, PTA [facility] [address]

## 2023-03-10 ENCOUNTER — HOSPITAL ENCOUNTER (OUTPATIENT)
Facility: HOSPITAL | Age: 63
Setting detail: RECURRING SERIES
Discharge: HOME | End: 2023-03-13
Payer: COMMERCIAL

## 2023-03-10 PROCEDURE — 97140 MANUAL THERAPY 1/> REGIONS: CPT

## 2023-03-10 PROCEDURE — 97110 THERAPEUTIC EXERCISES: CPT

## 2023-03-10 NOTE — PROGRESS NOTES
PHYSICAL / OCCUPATIONAL THERAPY - DAILY TREATMENT NOTE (updated )    Patient Name: Jamie Escobar    Date: 3/10/2023    : 1960  Insurance: Payor: Amarilis Baker / Plan: [facility] / Product Type: *No Product type* /      Patient  verified Yes   Visit #   Current / Total 4 12   Time   In / Out 2:30 3:10   Pain   In / Out 2 2   Subjective Functional Status/Changes: Pt reports that she shoulder is feeling a little better recently. Changes to:  Meds, Allergies, Med Hx, Sx Hx? If yes, update Summary List No      TREATMENT AREA =  No admission diagnoses are documented for this encounter. OBJECTIVE    Modalities Rationale:     decrease inflammation and decrease pain to improve patient's ability to progress to PLOF and address remaining functional goals. min [] Estim Unattended, type/location:                                      []  w/ice    []  w/heat    min [] Estim Attended, type/location:                                     []  w/US     []  w/ice    []  w/heat    []  TENS insruct      min []  Mechanical Traction: type/lbs                   []  pro   []  sup   []  int   []  cont    []  before manual    []  after manual    min []  Ultrasound, settings/location:      min []  Iontophoresis w/ dexamethasone, location:                                               []  take home patch       []  in clinic   10 min  unbill [x]  Ice     []  Heat    location/position: L shoulder    min []  Paraffin,  details:     min []  Vasopneumatic Device, press/temp:     min []  Celeste Calderon / Baldomero Connell: If using vaso (only need to measure limb vaso being performed on)      pre-treatment girth :       post-treatment girth :       measured at (landmark location) :      min []  Other:    Skin assessment post-treatment (if applicable):    []  intact    []  redness- no adverse reaction                 []redness - adverse reaction:         Therapeutic Procedures:   Tx Min Billable or 1:1 Min (if diff from Tx Min) Procedure, Rationale, Specifics   15  54619 Therapeutic Exercise (timed):  increase ROM, strength, coordination, balance, and proprioception to improve patient's ability to progress to PLOF and address remaining functional goals. (see flow sheet as applicable)     Details if applicable:       15  11502 Manual Therapy (timed):  decrease pain, increase ROM, and increase tissue extensibility to improve patient's ability to progress to PLOF and address remaining functional goals. The manual therapy interventions were performed at a separate and distinct time from the therapeutic activities interventions . (see flow sheet as applicable)     Details if applicable: PROM to L shoulder           Details if applicable:            Details if applicable:            Details if applicable:     27  [facility] Totals Reminder: bill using total billable min of TIMED therapeutic procedures (example: do not include dry needle or estim unattended, both untimed codes, in totals to left)  8-22 min = 1 unit; 23-37 min = 2 units; 38-52 min = 3 units; 53-67 min = 4 units; 68-82 min = 5 units   Total Total     [x]  Patient Education billed concurrently with other procedures   [x] Review HEP    [] Progressed/Changed HEP, detail:    [] Other detail:       Objective Information/Functional Measures/Assessment    PROM flexion 90deg  Pt still demonstrating increased guarding with PROM    Patient will continue to benefit from skilled PT / OT services to modify and progress therapeutic interventions, analyze and address functional mobility deficits, analyze and address ROM deficits, analyze and address strength deficits, analyze and address soft tissue restrictions, and analyze and cue for proper movement patterns to address functional deficits and attain remaining goals.     Progress toward goals / Updated goals:  []  See Progress Note/Recertification    Good Progress to    [] STG    [x] LTG  1 as shown by improved mobility needed for ADLs    PLAN  Yes Continue plan of care  [x]  Upgrade activities as tolerated  []  Discharge due to :  []  Other:    Sanna Lugo, PT    3/10/2023    3:40 PM    Future Appointments   Date Time Provider Mahesh Wiggins   3/14/2023  2:30 PM Soledad Jean, PTA [facility]   3/17/2023  2:30 PM Sanna Lugo, PT [facility]   3/21/2023  2:30 PM Soledad Jean, PTA [facility]   3/23/2023  1:30 PM Soledad Jean, PTA [facility]   3/28/2023  1:30 PM Sanna Lugo, PT [facility]   3/30/2023  1:30 PM Soledad Jean, PTA [facility]

## 2023-03-14 ENCOUNTER — HOSPITAL ENCOUNTER (OUTPATIENT)
Facility: HOSPITAL | Age: 63
Setting detail: RECURRING SERIES
Discharge: HOME | End: 2023-03-17
Payer: COMMERCIAL

## 2023-03-14 PROCEDURE — 97140 MANUAL THERAPY 1/> REGIONS: CPT

## 2023-03-14 PROCEDURE — 97110 THERAPEUTIC EXERCISES: CPT

## 2023-03-14 NOTE — PROGRESS NOTES
PHYSICAL / OCCUPATIONAL THERAPY - DAILY TREATMENT NOTE (updated )    Patient Name: Cristiane Aponte    Date: 3/14/2023    : 1960  Insurance: Payor: Damian Sherman / Plan: [facility] / Product Type: *No Product type* /      Patient  verified Yes   Visit #   Current / Total 5 12   Time   In / Out 2:30 3:10   Pain   In / Out 2 2   Subjective Functional Status/Changes: Pt reports that it is a little sore from lifting things around the house. Changes to:  Meds, Allergies, Med Hx, Sx Hx? If yes, update Summary List No      TREATMENT AREA =  No admission diagnoses are documented for this encounter. OBJECTIVE    Modalities Rationale:     decrease inflammation and decrease pain to improve patient's ability to progress to PLOF and address remaining functional goals. min [] Estim Unattended, type/location:                                      []  w/ice    []  w/heat    min [] Estim Attended, type/location:                                     []  w/US     []  w/ice    []  w/heat    []  TENS insruct      min []  Mechanical Traction: type/lbs                   []  pro   []  sup   []  int   []  cont    []  before manual    []  after manual    min []  Ultrasound, settings/location:      min []  Iontophoresis w/ dexamethasone, location:                                               []  take home patch       []  in clinic   10 min  unbill [x]  Ice     []  Heat    location/position:     min []  Paraffin,  details:     min []  Vasopneumatic Device, press/temp:     min []  Matta Fogo / Christian Failing: If using vaso (only need to measure limb vaso being performed on)      pre-treatment girth :       post-treatment girth :       measured at (landmark location) :      min []  Other:    Skin assessment post-treatment (if applicable):    []  intact    []  redness- no adverse reaction                 []redness - adverse reaction:         Therapeutic Procedures:   Tx Min Billable or 1:1 Min (if diff from Tx Min) Procedure, Rationale, Specifics     31844 Therapeutic Exercise (timed):  increase ROM, strength, coordination, balance, and proprioception to improve patient's ability to progress to PLOF and address remaining functional goals. (see flow sheet as applicable)     Details if applicable:         80532 Manual Therapy (timed):  decrease pain and increase ROM to improve patient's ability to progress to PLOF and address remaining functional goals. The manual therapy interventions were performed at a separate and distinct time from the therapeutic activities interventions . (see flow sheet as applicable)     Details if applicable:            Details if applicable:            Details if applicable:            Details if applicable:       [facility] Totals Reminder: bill using total billable min of TIMED therapeutic procedures (example: do not include dry needle or estim unattended, both untimed codes, in totals to left)  8-22 min = 1 unit; 23-37 min = 2 units; 38-52 min = 3 units; 53-67 min = 4 units; 68-82 min = 5 units   Total Total     [x]  Patient Education billed concurrently with other procedures   [x] Review HEP    [] Progressed/Changed HEP, detail:    [] Other detail:       Objective Information/Functional Measures/Assessment    PROM flexion: 90deg  Performed PROM in supine today for hte first time. Less overall guarding    Patient will continue to benefit from skilled PT / OT services to modify and progress therapeutic interventions, analyze and address functional mobility deficits, analyze and address ROM deficits, and analyze and address strength deficits to address functional deficits and attain remaining goals.     Progress toward goals / Updated goals:  []  See Progress Note/Recertification    Good Progress to    [] STG    [x] LTG  1 as shown by improved mobility needed for ADLs    PLAN  Yes Continue plan of care  [x]  Upgrade activities as tolerated  []  Discharge due to :  []  Other:    Mark Brian Evelin Sullivan, PT    3/14/2023    3:48 PM    Future Appointments   Date Time Provider Mahesh Rosalie   3/17/2023  2:30 PM Dara Muñoz, PT [facility] [address]   3/21/2023  2:30 PM Sidonie Fraction, PTA [facility] [address]   3/23/2023  1:30 PM Sidonie Fraction, PTA [facility] [address]   3/28/2023  1:30 PM Аннаna Wanda, PT [facility] [address]   3/30/2023  1:30 PM Sidonie Fraction, PTA [facility] [address]

## 2023-03-17 ENCOUNTER — HOSPITAL ENCOUNTER (OUTPATIENT)
Facility: HOSPITAL | Age: 63
Setting detail: RECURRING SERIES
Discharge: HOME | End: 2023-03-20
Payer: COMMERCIAL

## 2023-03-17 PROCEDURE — 97140 MANUAL THERAPY 1/> REGIONS: CPT

## 2023-03-17 PROCEDURE — 97110 THERAPEUTIC EXERCISES: CPT

## 2023-03-17 PROCEDURE — 97112 NEUROMUSCULAR REEDUCATION: CPT

## 2023-03-17 NOTE — PROGRESS NOTES
tolerated  []  Discharge due to :  []  Other:    Josefa Lugo, PT    3/17/2023    3:34 PM    Future Appointments   Date Time Provider Mahesh Wiggins   3/21/2023  2:30 PM Lori Bay, [facility]   3/23/2023  1:30 PM Lori Bay, PTA [facility]   3/28/2023  1:30 PM Josefa Lugo, PT [facility]   3/30/2023  1:30 PM Lori Bay, PTA [facility]   4/4/2023  3:00 PM Josefa Lugo, PT [facility]   4/7/2023  3:00 PM Josefa Lugo, PT [facility]   4/11/2023 10:00 AM Josefa Lugo, PT [facility]   4/14/2023  3:00 PM Josefa Lugo, PT [facility]

## 2023-03-21 ENCOUNTER — HOSPITAL ENCOUNTER (OUTPATIENT)
Facility: HOSPITAL | Age: 63
Setting detail: RECURRING SERIES
Discharge: HOME | End: 2023-03-24
Payer: COMMERCIAL

## 2023-03-21 PROCEDURE — 97140 MANUAL THERAPY 1/> REGIONS: CPT

## 2023-03-21 PROCEDURE — 97535 SELF CARE MNGMENT TRAINING: CPT

## 2023-03-21 NOTE — PROGRESS NOTES
PHYSICAL / OCCUPATIONAL THERAPY - DAILY TREATMENT NOTE (updated )    Patient Name: Mookie Rutledge    Date: 3/21/2023    : 1960  Insurance: Payor: Reginald Flanagan / Plan: [facility] / Product Type: *No Product type* /      Patient  verified Yes     Visit #   Current / Total 7 12   Time   In / Out 2:40 pm 3:30    Pain   In / Out 2 0   Subjective Functional Status/Changes: Pt reports she woke up with some swelling in left shoulder yesterday. Today it was fine. Steri strips have all fallen off but one. Pt reports she slept in bed last night. She was needle pointing yesterday and in brace got a box down out of the closet   Changes to:  Meds, Allergies, Med Hx, Sx Hx? If yes, update Summary List no       TREATMENT AREA =  No admission diagnoses are documented for this encounter. OBJECTIVE    Modalities Rationale:     decrease edema, decrease inflammation, decrease pain, and increase tissue extensibility to improve patient's ability to progress to PLOF and address remaining functional goals. min [] Estim Unattended, type/location:                                      []  w/ice    []  w/heat    min [] Estim Attended, type/location:                                     []  w/US     []  w/ice    []  w/heat    []  TENS insruct      min []  Mechanical Traction: type/lbs                   []  pro   []  sup   []  int   []  cont    []  before manual    []  after manual    min []  Ultrasound, settings/location:      min []  Iontophoresis w/ dexamethasone, location:                                               []  take home patch       []  in clinic   10/10 min  unbill [x]  Ice     [x]  Heat    location/position: Semi reclined L shoulder    min []  Paraffin,  details:     min []  Vasopneumatic Device, press/temp:     min []  Alexys Sniff / Jenny Jeison:     If using vaso (only need to measure limb vaso being performed on)      pre-treatment girth :       post-treatment girth :       measured

## 2023-03-23 ENCOUNTER — HOSPITAL ENCOUNTER (OUTPATIENT)
Facility: HOSPITAL | Age: 63
Setting detail: RECURRING SERIES
Discharge: HOME | End: 2023-03-26
Payer: COMMERCIAL

## 2023-03-23 PROCEDURE — 97535 SELF CARE MNGMENT TRAINING: CPT

## 2023-03-23 PROCEDURE — 97140 MANUAL THERAPY 1/> REGIONS: CPT

## 2023-03-23 NOTE — PROGRESS NOTES
integration to address functional deficits and attain remaining goals. Progress toward goals / Updated goals:  []  See Progress Note/Recertification    Review precautions per protocol  Pt education in proper form with pendulums   Improving PROM into shoulder flexion with decreasing ms guarding.      PLAN  Yes  Continue plan of care  [x]  Upgrade activities as tolerated  []  Discharge due to :  []  Other:    Ortega Diana PTA    3/23/2023    1:31 PM    Future Appointments   Date Time Provider Mahesh Wiggins   3/28/2023  1:30 PM Bethesdaezequiel Dobson, PT [facility]   3/30/2023  1:30 PM Ortega Diana PTA [facility]   4/4/2023  3:00 PM Addie , PT [facility]   4/7/2023  3:00 PM Addie , PT [facility]   4/11/2023 10:00 AM Bethesda , PT [facility]   4/14/2023  3:00 PM Addie , PT [facility]

## 2023-03-28 ENCOUNTER — HOSPITAL ENCOUNTER (OUTPATIENT)
Facility: HOSPITAL | Age: 63
Setting detail: RECURRING SERIES
Discharge: HOME | End: 2023-03-31
Payer: COMMERCIAL

## 2023-03-28 PROCEDURE — 97140 MANUAL THERAPY 1/> REGIONS: CPT

## 2023-03-28 PROCEDURE — 97110 THERAPEUTIC EXERCISES: CPT

## 2023-03-28 NOTE — PROGRESS NOTES
4:01 PM    Future Appointments   Date Time Provider Mahesh Wiggins   3/30/2023  7:30 AM Toan Left, PT [facility]   4/4/2023  3:00 PM Toan Left, PT [facility]   4/7/2023  3:00 PM Toan Left, PT [facility]   4/11/2023 10:00 AM Toan Left, PT [facility]   4/14/2023  3:00 PM Toan Left, PT [facility]

## 2023-03-30 ENCOUNTER — HOSPITAL ENCOUNTER (OUTPATIENT)
Facility: HOSPITAL | Age: 63
Setting detail: RECURRING SERIES
End: 2023-03-30
Payer: COMMERCIAL

## 2023-03-30 PROCEDURE — 97140 MANUAL THERAPY 1/> REGIONS: CPT

## 2023-03-30 NOTE — PROGRESS NOTES
patient. [de-identified] Signature:_________________________   DATE:_________   TIME:________                           Jayro Silver    ** Signature, Date and Time must be completed for valid certification **  Please sign and fax to [facility] [phone].   Thank you

## 2023-03-30 NOTE — PROGRESS NOTES
PHYSICAL / OCCUPATIONAL THERAPY - DAILY TREATMENT NOTE (updated )    Patient Name: Liana Hyde    Date: 3/30/2023    : 1960  Insurance: Payor: Morrice / Plan: [facility] / Product Type: *No Product type* /      Patient  verified Yes   Visit #   Current / Total 10 20   Time   In / Out 7:30 8:10   Pain   In / Out 2 2   Subjective Functional Status/Changes: See PN   Changes to:  Meds, Allergies, Med Hx, Sx Hx? If yes, update Summary List No      TREATMENT AREA =  No admission diagnoses are documented for this encounter. OBJECTIVE    Modalities Rationale:     decrease inflammation and decrease pain to improve patient's ability to progress to PLOF and address remaining functional goals. min [] Estim Unattended, type/location:                                      []  w/ice    []  w/heat    min [] Estim Attended, type/location:                                     []  w/US     []  w/ice    []  w/heat    []  TENS insruct      min []  Mechanical Traction: type/lbs                   []  pro   []  sup   []  int   []  cont    []  before manual    []  after manual    min []  Ultrasound, settings/location:      min []  Iontophoresis w/ dexamethasone, location:                                               []  take home patch       []  in clinic    min  unbill []  Ice     []  Heat    location/position:     min []  Paraffin,  details:     min []  Vasopneumatic Device, press/temp:     min []  Arielle Bravo / Jayjay Halon: If using vaso (only need to measure limb vaso being performed on)      pre-treatment girth :       post-treatment girth :       measured at (landmark location) :      min []  Other:    Skin assessment post-treatment (if applicable):    []  intact    []  redness- no adverse reaction                 []redness - adverse reaction:         Therapeutic Procedures:   Tx Min Billable or 1:1 Min (if diff from Tx Min) Procedure, Rationale, Specifics   [id number] Manual 4/7/2023  3:00 PM Yevette Harada, PT [facility]   4/11/2023 10:00 AM Yevette Harada, PT [facility]   4/14/2023  3:00 PM Yevette Harada, PT [facility]

## 2023-04-04 ENCOUNTER — HOSPITAL ENCOUNTER (OUTPATIENT)
Facility: HOSPITAL | Age: 63
Setting detail: RECURRING SERIES
Discharge: HOME | End: 2023-04-07
Payer: COMMERCIAL

## 2023-04-04 PROCEDURE — 97112 NEUROMUSCULAR REEDUCATION: CPT

## 2023-04-04 PROCEDURE — 97140 MANUAL THERAPY 1/> REGIONS: CPT

## 2023-04-04 PROCEDURE — 97110 THERAPEUTIC EXERCISES: CPT

## 2023-04-04 NOTE — PROGRESS NOTES
and address strength deficits to address functional deficits and attain remaining goals.     Progress toward goals / Updated goals:  []  See Progress Note/Recertification    Good Progress to    [] STG    [x] LTG  1 as shown by improved overall mobility    PLAN  Yes Continue plan of care  [x]  Upgrade activities as tolerated  []  Discharge due to :  []  Other:    Dara Muñoz, PT    4/4/2023    3:57 PM    Future Appointments   Date Time Provider Mahesh Wiggins   4/7/2023  3:00 PM Dara Muñoz PT [facility]   4/11/2023 10:00 AM Dara Muñoz PT [facility]   4/14/2023  3:00 PM Dara Muñoz PT [facility]

## 2023-04-14 ENCOUNTER — APPOINTMENT (OUTPATIENT)
Facility: HOSPITAL | Age: 63
End: 2023-04-14
Payer: COMMERCIAL

## 2023-04-14 ENCOUNTER — HOSPITAL ENCOUNTER (OUTPATIENT)
Facility: HOSPITAL | Age: 63
Setting detail: RECURRING SERIES
End: 2023-04-14
Payer: COMMERCIAL

## 2023-04-14 ENCOUNTER — HOSPITAL ENCOUNTER (OUTPATIENT)
Facility: HOSPITAL | Age: 63
Setting detail: RECURRING SERIES
Discharge: HOME | End: 2023-04-17
Payer: COMMERCIAL

## 2023-04-14 PROCEDURE — 97112 NEUROMUSCULAR REEDUCATION: CPT

## 2023-04-14 PROCEDURE — 97110 THERAPEUTIC EXERCISES: CPT

## 2023-04-14 PROCEDURE — 97140 MANUAL THERAPY 1/> REGIONS: CPT

## 2023-04-19 ENCOUNTER — HOSPITAL ENCOUNTER (OUTPATIENT)
Facility: HOSPITAL | Age: 63
Setting detail: RECURRING SERIES
Discharge: HOME | End: 2023-04-22
Payer: COMMERCIAL

## 2023-04-19 PROCEDURE — 97112 NEUROMUSCULAR REEDUCATION: CPT

## 2023-04-19 PROCEDURE — 97140 MANUAL THERAPY 1/> REGIONS: CPT

## 2023-04-19 PROCEDURE — 97110 THERAPEUTIC EXERCISES: CPT

## 2023-04-19 NOTE — PROGRESS NOTES
PHYSICAL / OCCUPATIONAL THERAPY - DAILY TREATMENT NOTE (updated )    Patient Name: John Mendoza    Date: 2023    : 1960  Insurance: Payor: Hernán Javed / Plan: [facility] / Product Type: *No Product type* /      Patient  verified Yes   Visit #   Current / Total 15 20   Time   In / Out 8:20 9:20   Pain   In / Out 2 2   Subjective Functional Status/Changes: Pt reports tightness in the shoulder most of the time. Pt states she is using it more often for ADLs   Changes to:  Meds, Allergies, Med Hx, Sx Hx? If yes, update Summary List No      TREATMENT AREA =  No admission diagnoses are documented for this encounter. OBJECTIVE    Modalities Rationale:     decrease inflammation and decrease pain to improve patient's ability to progress to PLOF and address remaining functional goals. min [] Estim Unattended, type/location:                                      []  w/ice    []  w/heat    min [] Estim Attended, type/location:                                     []  w/US     []  w/ice    []  w/heat    []  TENS insruct      min []  Mechanical Traction: type/lbs                   []  pro   []  sup   []  int   []  cont    []  before manual    []  after manual    min []  Ultrasound, settings/location:      min []  Iontophoresis w/ dexamethasone, location:                                               []  take home patch       []  in clinic   10 min  unbill [x]  Ice     []  Heat    location/position: L shoulder    min []  Paraffin,  details:     min []  Vasopneumatic Device, press/temp:     min []  Familia Stephens / Michelle Friend:     If using vaso (only need to measure limb vaso being performed on)      pre-treatment girth :       post-treatment girth :       measured at (landmark location) :      min []  Other:    Skin assessment post-treatment (if applicable):    []  intact    []  redness- no adverse reaction                 []redness - adverse reaction:         Therapeutic

## 2023-04-24 ENCOUNTER — HOSPITAL ENCOUNTER (OUTPATIENT)
Facility: HOSPITAL | Age: 63
Setting detail: RECURRING SERIES
Discharge: HOME | End: 2023-04-27
Payer: COMMERCIAL

## 2023-04-24 PROCEDURE — 97140 MANUAL THERAPY 1/> REGIONS: CPT

## 2023-04-24 PROCEDURE — 97112 NEUROMUSCULAR REEDUCATION: CPT

## 2023-04-24 PROCEDURE — 97110 THERAPEUTIC EXERCISES: CPT

## 2023-04-24 NOTE — PROGRESS NOTES
[address] [facility] PHYSICAL THERAPY  [address] [facility], [address], [place], [address] Ph: [phone] Fx: [phone]  PHYSICAL THERAPY PROGRESS NOTE  Patient Name: Saira Santiago : 1960   Treatment/Medical Diagnosis: No admission diagnoses are documented for this encounter. Referral Source: Chanda Cabot*     Date of Initial Visit: 2023 Attended Visits: 16 Missed Visits: -     SUMMARY OF TREATMENT  PT consisted of manual therapy techniques, therapeutic exercises, and modalities to improve strength, improve mobility, decrease pain, and improve overall function. CURRENT STATUS  Pt is s/p L RCR and bicep tenodesis on 2023. Pt is showing some slow progress. Pt still having increased soreness with ADLs. AROM flexion: 105deg, Ext: 45deg, Abduction: 76deg, ER: 34deg. Pt is performing some light resistance band exercises and a lot of AAROM and ROM exercises to improve mobility. Pt unfortunately only has 4 more appoitments left on her insurance. Increase score on FOTO to > or = to 65 points to demo an increase in functional activity tolerance with the LE  Status at last Eval: 12  Current Status: DNA  Goal Met?  no     2. Pt will note < or = 2/10 pain with all mobility to improve comfort with ADLs. Status at last Eval: 9  Current Status: 2-3  Goal Met?  no     3. Pt will demonstrate a GROC score of >/= +5 to show overall improvement in function  Status at last Eval: NA  Current Status: DNA  Goal Met?  no      New Goals to be achieved in __4__ weeks  1. Continue unmet goals above  2.   3.     RECOMMENDATIONS  Pt to continue PT 1x a week for 4-6 weeks due to insurance. Pt is going to appeal for more visits when appointments run out.  Pt would benefit from 2x a week for 4-6 weeks if able to for improved mobility and strength needed for ADLs    MERE Hauser, PT       2023       7:19

## 2023-04-24 NOTE — PROGRESS NOTES
PHYSICAL / OCCUPATIONAL THERAPY - DAILY TREATMENT NOTE (updated )    Patient Name: Sabas Lorenz    Date: 2023    : 1960  Insurance: Payor: Mamie Nicholson / Plan: [facility] / Product Type: *No Product type* /      Patient  verified Yes   Visit #   Current / Total 16 20   Time   In / Out 7:40 8:30   Pain   In / Out 2 2   Subjective Functional Status/Changes: See PN   Changes to:  Meds, Allergies, Med Hx, Sx Hx? If yes, update Summary List No      TREATMENT AREA =  No admission diagnoses are documented for this encounter. OBJECTIVE    Modalities Rationale:     decrease inflammation, decrease pain, and increase tissue extensibility to improve patient's ability to progress to PLOF and address remaining functional goals. min [] Estim Unattended, type/location:                                      []  w/ice    []  w/heat    min [] Estim Attended, type/location:                                     []  w/US     []  w/ice    []  w/heat    []  TENS insruct      min []  Mechanical Traction: type/lbs                   []  pro   []  sup   []  int   []  cont    []  before manual    []  after manual    min []  Ultrasound, settings/location:      min []  Iontophoresis w/ dexamethasone, location:                                               []  take home patch       []  in clinic   10 min  unbill [x]  Ice     []  Heat    location/position: L shoulder    min []  Paraffin,  details:     min []  Vasopneumatic Device, press/temp:     min []  Sarita Lacy / Dasia Geo: If using vaso (only need to measure limb vaso being performed on)      pre-treatment girth :       post-treatment girth :       measured at (landmark location) :      min []  Other:    Skin assessment post-treatment (if applicable):    []  intact    []  redness- no adverse reaction                 []redness - adverse reaction:         Therapeutic Procedures:   Tx Min Billable or 1:1 Min (if diff from Redd)

## 2023-05-03 ENCOUNTER — HOSPITAL ENCOUNTER (OUTPATIENT)
Facility: HOSPITAL | Age: 63
Setting detail: RECURRING SERIES
Discharge: HOME | End: 2023-05-06
Payer: COMMERCIAL

## 2023-05-03 PROCEDURE — 97530 THERAPEUTIC ACTIVITIES: CPT

## 2023-05-03 PROCEDURE — 97112 NEUROMUSCULAR REEDUCATION: CPT

## 2023-05-03 PROCEDURE — 97140 MANUAL THERAPY 1/> REGIONS: CPT
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
## 2023-05-10 ENCOUNTER — HOSPITAL ENCOUNTER (OUTPATIENT)
Facility: HOSPITAL | Age: 63
Setting detail: RECURRING SERIES
Discharge: HOME | End: 2023-05-13
Payer: COMMERCIAL

## 2023-05-10 PROCEDURE — 97110 THERAPEUTIC EXERCISES: CPT

## 2023-05-10 PROCEDURE — 97140 MANUAL THERAPY 1/> REGIONS: CPT

## 2023-05-10 PROCEDURE — 97112 NEUROMUSCULAR REEDUCATION: CPT

## 2023-05-10 NOTE — PROGRESS NOTES
[address] [facility] PHYSICAL THERAPY  [address] [facility], [address], [place], [address] Ph: [phone] Fx: [phone]  PHYSICAL THERAPY PROGRESS NOTE  Patient Name: Kenroy Avila : 1960   Treatment/Medical Diagnosis: L shoulder pain  Sx Date: L RCR and bicep tenodesis on 2023   Referral Source: Melecio Doll*     Date of Initial Visit: 2023 Attended Visits: 18 Missed Visits: -     SUMMARY OF TREATMENT  PT consisted of manual therapy techniques, therapeutic exercises, and modalities to improve strength, improve mobility, decrease pain, and improve overall function. CURRENT STATUS  Pt is s/p L RCR and bicep tenodesis on 2023. Pt is showing good progress and has been reporting reduced pain, improving mobility. AROM flexion: 115deg, Ext: 45deg, Abduction: 100 deg, ER: 40 deg. PROM flexion: 140 deg, ER to 55 deg. Pt is performing light resistance band exercises, isometrics and strengthening, ROM progression per protocol. New Goals to be achieved in _2_ treatments:  1. Pt will demonstrate improved AROM by 10 deg in all planes to improve ADLs, IADLs. 2. Increase score on FOTO to > or = to 65 points to demo an increase in functional activity tolerance with the LE  3. Pt will be independent with finalized HEP to improve long term outcomes. RECOMMENDATIONS  Pt to continue PT 1x a week for 2 visits due to insurance limitations to 20 visits allowed only/year to address progression of HEP for strengthening, ROM and improved function. Shelly Brown, PT       5/10/2023       1:06 PM  ===================================================================  If you have any questions/comments please contact us directly at [phone]. Thank you for allowing us to assist in the care of your patient.
Information/Functional Measures/Assessment  Progression to isometrics abd, ER, added TB adduction. PROM flexion to 130 deg, ER to 50 deg. Patient will continue to benefit from skilled PT / OT services to modify and progress therapeutic interventions, analyze and address functional mobility deficits, analyze and address ROM deficits, analyze and address strength deficits, and analyze and address soft tissue restrictions to address functional deficits and attain remaining goals. Progress toward goals / Updated goals:  []  See Progress Note/Recertification  See PN.     PLAN  Yes Continue plan of care  [x]  Upgrade activities as tolerated  []  Discharge due to :  []  Other:    Jonah Loya, PT    5/10/2023    6:11 AM    Future Appointments   Date Time Provider Mahesh Wiggins   5/10/2023 11:00 AM Jonah Loya, PT [facility]   5/24/2023  9:40 AM Jonah Loya, PT [facility]   6/7/2023  9:40 AM Sandy Smith, PT [facility]

## 2023-05-24 ENCOUNTER — APPOINTMENT (OUTPATIENT)
Facility: HOSPITAL | Age: 63
End: 2023-05-24
Payer: COMMERCIAL

## 2023-05-25 ENCOUNTER — APPOINTMENT (OUTPATIENT)
Facility: HOSPITAL | Age: 63
End: 2023-05-25
Payer: COMMERCIAL

## 2023-05-26 ENCOUNTER — HOSPITAL ENCOUNTER (OUTPATIENT)
Facility: HOSPITAL | Age: 63
Setting detail: RECURRING SERIES
Discharge: HOME | End: 2023-05-29
Payer: COMMERCIAL

## 2023-05-26 PROCEDURE — 97140 MANUAL THERAPY 1/> REGIONS: CPT

## 2023-05-26 PROCEDURE — 97530 THERAPEUTIC ACTIVITIES: CPT

## 2023-05-26 PROCEDURE — 97110 THERAPEUTIC EXERCISES: CPT

## 2023-05-26 PROCEDURE — 97112 NEUROMUSCULAR REEDUCATION: CPT

## 2023-06-07 ENCOUNTER — HOSPITAL ENCOUNTER (OUTPATIENT)
Facility: HOSPITAL | Age: 63
Setting detail: RECURRING SERIES
End: 2023-06-07
Payer: COMMERCIAL

## 2023-06-13 ENCOUNTER — APPOINTMENT (OUTPATIENT)
Facility: HOSPITAL | Age: 63
End: 2023-06-13
Payer: COMMERCIAL

## 2023-06-22 ENCOUNTER — HOSPITAL ENCOUNTER (OUTPATIENT)
Facility: HOSPITAL | Age: 63
Setting detail: RECURRING SERIES
Discharge: HOME | End: 2023-06-25
Payer: COMMERCIAL

## 2023-06-22 PROCEDURE — 97110 THERAPEUTIC EXERCISES: CPT

## 2023-06-22 PROCEDURE — 97112 NEUROMUSCULAR REEDUCATION: CPT

## 2023-06-22 PROCEDURE — 97140 MANUAL THERAPY 1/> REGIONS: CPT

## 2024-01-31 ENCOUNTER — OFFICE VISIT (OUTPATIENT)
Age: 64
End: 2024-01-31
Payer: COMMERCIAL

## 2024-01-31 VITALS
HEIGHT: 68 IN | OXYGEN SATURATION: 100 % | DIASTOLIC BLOOD PRESSURE: 60 MMHG | TEMPERATURE: 96.9 F | SYSTOLIC BLOOD PRESSURE: 107 MMHG | BODY MASS INDEX: 23.52 KG/M2 | HEART RATE: 75 BPM | WEIGHT: 155.2 LBS

## 2024-01-31 DIAGNOSIS — D51.3 OTHER DIETARY VITAMIN B12 DEFICIENCY ANEMIA: Primary | ICD-10-CM

## 2024-01-31 PROCEDURE — 1036F TOBACCO NON-USER: CPT | Performed by: SPECIALIST

## 2024-01-31 PROCEDURE — 99215 OFFICE O/P EST HI 40 MIN: CPT | Performed by: SPECIALIST

## 2024-01-31 PROCEDURE — 3017F COLORECTAL CA SCREEN DOC REV: CPT | Performed by: SPECIALIST

## 2024-01-31 PROCEDURE — G8484 FLU IMMUNIZE NO ADMIN: HCPCS | Performed by: SPECIALIST

## 2024-01-31 PROCEDURE — G8420 CALC BMI NORM PARAMETERS: HCPCS | Performed by: SPECIALIST

## 2024-01-31 PROCEDURE — G8428 CUR MEDS NOT DOCUMENT: HCPCS | Performed by: SPECIALIST

## 2024-01-31 RX ORDER — MULTIVIT WITH IRON,MINERALS
1 TABLET,CHEWABLE ORAL DAILY
COMMUNITY

## 2024-01-31 ASSESSMENT — PATIENT HEALTH QUESTIONNAIRE - PHQ9
1. LITTLE INTEREST OR PLEASURE IN DOING THINGS: 0
SUM OF ALL RESPONSES TO PHQ QUESTIONS 1-9: 0
2. FEELING DOWN, DEPRESSED OR HOPELESS: 0
SUM OF ALL RESPONSES TO PHQ QUESTIONS 1-9: 0
SUM OF ALL RESPONSES TO PHQ QUESTIONS 1-9: 0
SUM OF ALL RESPONSES TO PHQ9 QUESTIONS 1 & 2: 0
SUM OF ALL RESPONSES TO PHQ QUESTIONS 1-9: 0

## 2024-01-31 NOTE — PATIENT INSTRUCTIONS
After Surgery: Any complete chewable, such as: Dundee’s Complete chewables.    Avoid Dundee sours or gummies.  They lack iron and other important nutrients and also have added sugar.    Continue with chewable vitamin or change to adult complete multivitamin one month after surgery. Menstruating women can take a prenatal vitamin.    Make sure has at least 18 mg iron and 400-800 mcg folic acid):    Vitamin B12, B Complex Vitamin, and Biotin  Start taking within a month after surgery.   Vitamin B12:  1000 mcg of Vitamin B12  at least three times weekly    Must take sublingually (meaning you take it under your tongue) or in a liquid drop form for easy absorption.        B Complex Vitamin: Take a pill or liquid drop form once daily.       Biotin: This vitamin can help prevent hair loss.    Recommend 5mg   (5000 mcg) a day  Biotin is Optional

## 2024-01-31 NOTE — PROGRESS NOTES
Jane Potter  is a 63 y.o. female who presents for follow-up about 4 years following laparoscopic gastric bypass surgery.   Surgery related complication: none.  She has lost a total of 90 pounds since surgery.  Body mass index is 23.95 kg/m²..  Loss of EBW 95%.      The patient presents today to assess their progress toward their weight loss goal & to address any issues that may be present:   She is tolerating solids without difficulty, reports no issues and denies vomiting and abdominal pain.    The patients diet choices have been reviewed today and counseling was given.         Taking vitamins as recommended.    The patient's exercise level: very active or exercises 5 times a week.      Changes in her medical history and medications have been reviewed.      Pain score today: 0    Comorbidities:    Hypertension: improved  Diabetes: N/A  Obstructive Sleep Apnea: N/A  Hyperlipidemia: improved  Stress Urinary Incontinence: {N/A  Gastroesophageal Reflux: N/A  Weight related arthropathy:improved      Patient Active Problem List   Diagnosis    Elevated liver enzymes    Intestinal malabsorption    S/P gastric bypass     Past Medical History:   Diagnosis Date    Arthritic-like pain     Diverticulitis     Hospitalized April 2019 [facility]    Edema     Elevated liver enzymes     Elevated sedimentation rate     Intestinal malabsorption 12/12/2019    Obstructive sleep apnea     Uses CPAP, Instructed to bring DOS    S/P gastric bypass 12/12/2019 12/06/19 by Dr. Allen    Severe obesity (BMI 35.0-39.9) with comorbidity (HCC)     Steatosis of liver 12/12/2019     Past Surgical History:   Procedure Laterality Date    GASTRIC BYPASS SURGERY  12/2019    Tiffany with hiatal hernia repair    ORTHOPEDIC SURGERY Right 2005    skin graft right first digit after trauma     Current Outpatient Medications   Medication Sig Dispense Refill    Pediatric Multivitamins-Iron (FLINTSTONES COMPLETE) 10 MG CHEW tablet Take 1

## 2024-04-23 NOTE — PROGRESS NOTES
Sherren Saner presents today for No chief complaint on file. Is someone accompanying this pt? yes    Is the patient using any DME equipment during [address]? no    Depression Screening:  3 most recent PHQ Screens 12/19/2019   Little interest or pleasure in doing things Not at all   Feeling down, depressed, irritable, or hopeless Not at all   Total Score PHQ 2 0       Learning Assessment:  Learning Assessment 1/7/2019   PRIMARY LEARNER Patient   HIGHEST LEVEL OF EDUCATION - PRIMARY LEARNER  SOME COLLEGE   BARRIERS PRIMARY LEARNER NONE   PRIMARY LANGUAGE ENGLISH   LEARNER PREFERENCE PRIMARY OTHER (COMMENT)   ANSWERED BY patient   RELATIONSHIP SELF       Abuse Screening:  Abuse Screening Questionnaire 12/19/2019   Do you ever feel afraid of your partner? N   Are you in a relationship with someone who physically or mentally threatens you? N   Is it safe for you to go home? Y       Fall Risk  No flowsheet data found. Coordination of Care:  1. Have you been to the ER, urgent care clinic since your last visit? Hospitalized since your last visit? no    2. Have you seen or consulted any other health care providers outside of the [address] [place] [address] since your last visit? Include any pap smears or colon screening.  no Normal for race/Pink

## 2025-01-22 ENCOUNTER — TELEMEDICINE (OUTPATIENT)
Age: 65
End: 2025-01-22
Payer: COMMERCIAL

## 2025-01-22 DIAGNOSIS — K90.9 INTESTINAL MALABSORPTION, UNSPECIFIED TYPE: Primary | ICD-10-CM

## 2025-01-22 DIAGNOSIS — K90.9 INTESTINAL MALABSORPTION, UNSPECIFIED TYPE: ICD-10-CM

## 2025-01-22 PROCEDURE — 99214 OFFICE O/P EST MOD 30 MIN: CPT | Performed by: SPECIALIST

## 2025-01-22 NOTE — PROGRESS NOTES
Jane Potter  is a 64 y.o. female who presents for follow-up about 5 years following laparoscopic gastric bypass surgery.   Surgery related complication: none.  She has lost a total of 85 pounds since surgery.  There is no height or weight on file to calculate BMI..  Loss of EBW 95%%.      The patient presents today to assess their progress toward their weight loss goal & to address any issues that may be present:   She is tolerating solids without difficulty, reports no issues and denies vomiting and abdominal pain.    The patients diet choices have been reviewed today and counseling was given.         Taking vitamins as recommended.    The patient's exercise level: very active or exercises 4 times a week.      Changes in her medical history and medications have been reviewed.      Pain score today: 0    Comorbidities:    Hypertension: N/A  Diabetes: N/A  Obstructive Sleep Apnea: resolved   Hyperlipidemia: improved  Stress Urinary Incontinence: {N/A  Gastroesophageal Reflux: resolved   Weight related arthropathy:improved      Patient Active Problem List   Diagnosis    Elevated liver enzymes    Intestinal malabsorption    S/P gastric bypass     Past Medical History:   Diagnosis Date    Arthritic-like pain     Diverticulitis     Hospitalized April 2019 [facility]    Edema     Elevated liver enzymes     Elevated sedimentation rate     Intestinal malabsorption 12/12/2019    Obstructive sleep apnea     Uses CPAP, Instructed to bring DOS    S/P gastric bypass 12/12/2019 12/06/19 by Dr. Allen    Severe obesity (BMI 35.0-39.9) with comorbidity     Steatosis of liver 12/12/2019     Past Surgical History:   Procedure Laterality Date    GASTRIC BYPASS SURGERY  12/2019    Bobbyezequiel with hiatal hernia repair    ORTHOPEDIC SURGERY Right 2005    skin graft right first digit after trauma    ROTATOR CUFF REPAIR Left      Current Outpatient Medications   Medication Sig Dispense Refill    Pediatric

## (undated) DEVICE — CLIP SUT ENDOSCP F/2-0/3-0/4-0 -- LAPRA-TY

## (undated) DEVICE — MAJ-1414 SINGLE USE ADPATER BIOPSY VALV: Brand: SINGLE USE ADAPTOR BIOPSY VALVE

## (undated) DEVICE — STERILE POLYISOPRENE POWDER-FREE SURGICAL GLOVES: Brand: PROTEXIS

## (undated) DEVICE — SUTURE ETHIB EXCL BR GRN TAPR PT 2-0 30 X563H X563H

## (undated) DEVICE — TROCAR ENDOSCP L100MM DIA15MM BLDELSS STBL SL ENDOPATH XCEL

## (undated) DEVICE — SUT PROL 2-0 30IN CT2 BLU --

## (undated) DEVICE — TRAY CATH OD16FR SIL URIN M STATLOK STBL DEV SURSTP

## (undated) DEVICE — ENDOCUT SCISSOR TIP, DISPOSABLE: Brand: RENEW

## (undated) DEVICE — KENDALL SCD EXPRESS SLEEVES, KNEE LENGTH, MEDIUM: Brand: KENDALL SCD

## (undated) DEVICE — INSUFFLATION NEEDLE TO ESTABLISH PNEUMOPERITONEUM.: Brand: INSUFFLATION NEEDLE

## (undated) DEVICE — MEDI-VAC NON-CONDUCTIVE SUCTION TUBING: Brand: CARDINAL HEALTH

## (undated) DEVICE — SOL IRRIGATION INJ NACL 0.9% 500ML BTL

## (undated) DEVICE — VISUALIZATION SYSTEM: Brand: CLEARIFY

## (undated) DEVICE — RELOAD STPL L60MM H1.5-3.6MM REG TISS BLU GRIPPING SURF B

## (undated) DEVICE — RELOAD STPL H1-2.5X45MM VASC THN TISS WHT 6 ROW B FRM SGL

## (undated) DEVICE — TROCAR ENDOSCP BLDELSS 12X100 MM W/ HNDL STBL SL OPT TIP

## (undated) DEVICE — AGENT HEMSTAT W4XL4IN OXIDIZED REGENERATED CELOS STRUCTURED

## (undated) DEVICE — SYR 3ML LL TIP 1/10ML GRAD --

## (undated) DEVICE — APPLIER CLP L SHFT DIA12MM 20 ROT MULT LIGACLP

## (undated) DEVICE — DEVON™ KNEE AND BODY STRAP 60" X 3" (1.5 M X 7.6 CM): Brand: DEVON

## (undated) DEVICE — [HIGH FLOW INSUFFLATOR,  DO NOT USE IF PACKAGE IS DAMAGED,  KEEP DRY,  KEEP AWAY FROM SUNLIGHT,  PROTECT FROM HEAT AND RADIOACTIVE SOURCES.]: Brand: PNEUMOSURE

## (undated) DEVICE — DISPOSABLE SUCTION/IRRIGATOR TUBE SET WITH TIP: Brand: AHTO

## (undated) DEVICE — TIP APPL L35CM RIG FOR SEAL EVICEL

## (undated) DEVICE — SUTURE PDS II SZ 0 L27IN ABSRB VLT L26MM CT-2 1/2 CIR Z334H

## (undated) DEVICE — LAPAROSCOPIC TROCAR SLEEVE/SINGLE USE: Brand: KII® OPTICAL ACCESS SYSTEM

## (undated) DEVICE — GOWN ISOLATN REG BLU POLY UNISX W/ THMB LOOP

## (undated) DEVICE — Device

## (undated) DEVICE — TROCAR ENDOSCP L100MM DIA12MM STBL SL BLDELSS ENDOPATH XCEL

## (undated) DEVICE — CUTTER ENDOSCP L340MM LIN ARTC SGL STROKE FIRING ENDOPATH

## (undated) DEVICE — GARMENT,MEDLINE,DVT,INT,CALF,MED, GEN2: Brand: MEDLINE

## (undated) DEVICE — STAPLER SKIN L440MM 32MM LNG 12 FIRING B FRM PWR + GRIPPING

## (undated) DEVICE — SOLUTION LACTATED RINGERS INJECTION USP

## (undated) DEVICE — AGENT HEMSTAT W6XL9IN OXIDIZED REGENERATED CELOS ABSRB FOR

## (undated) DEVICE — TISSUE RETRIEVAL SYSTEM: Brand: INZII RETRIEVAL SYSTEM

## (undated) DEVICE — PREP SKN PREVAIL 40ML APPL --

## (undated) DEVICE — BARIATRIC: Brand: MEDLINE INDUSTRIES, INC.

## (undated) DEVICE — RELOAD STPL L60MM H1-2.6MM MESENTERY THN TISS WHT 6 ROW

## (undated) DEVICE — FORCEPS BX OVL CUP SERR DISP CAP L 240CM RAD JAW 4

## (undated) DEVICE — SHEAR HARMONIC 5MMX45CM -- ACE 7+

## (undated) DEVICE — 4-PORT MANIFOLD: Brand: NEPTUNE 2

## (undated) DEVICE — SUT MONOCRYL PLUS UD 4-0 --

## (undated) DEVICE — TROCAR: Brand: KII® SLEEVE

## (undated) DEVICE — CATHETER JEJUSTMY AD 16FR 15CC L108IN THMB VLV FOR DCOMPR

## (undated) DEVICE — STAPLER INT L37CM STPL 21MM CIR ENDOSCP CRV INTLUMN B FRM

## (undated) DEVICE — ENDO CARRY-ON PROCEDURE KIT INCLUDES ENZYMATIC SPONGE, GAUZE, BIOHAZARD LABEL, TRAY, LUBRICANT, DIRTY SCOPE LABEL, WATER LABEL, TRAY, DRAWSTRING PAD, AND DEFENDO 4-PIECE KIT.: Brand: ENDO CARRY-ON PROCEDURE KIT

## (undated) DEVICE — SUTURE ETHLN SZ 3-0 L30IN NONABSORBABLE BLK FSL L30MM 3/8 1671H

## (undated) DEVICE — SEALANT HEMSTAT 5ML HUM FIBRIN THROM 2 VI APPL DEV EVICEL